# Patient Record
Sex: MALE | Race: WHITE | Employment: OTHER | URBAN - METROPOLITAN AREA
[De-identification: names, ages, dates, MRNs, and addresses within clinical notes are randomized per-mention and may not be internally consistent; named-entity substitution may affect disease eponyms.]

---

## 2017-05-19 DIAGNOSIS — N40.1 BENIGN NODULAR PROSTATIC HYPERPLASIA WITH LOWER URINARY TRACT SYMPTOMS: ICD-10-CM

## 2017-05-19 RX ORDER — DUTASTERIDE 0.5 MG/1
CAPSULE, LIQUID FILLED ORAL
Qty: 30 CAPSULE | Refills: 0 | Status: SHIPPED | OUTPATIENT
Start: 2017-05-19 | End: 2017-05-26 | Stop reason: SDUPTHER

## 2017-05-26 ENCOUNTER — OFFICE VISIT (OUTPATIENT)
Dept: FAMILY MEDICINE CLINIC | Age: 67
End: 2017-05-26

## 2017-05-26 VITALS
HEART RATE: 72 BPM | SYSTOLIC BLOOD PRESSURE: 126 MMHG | TEMPERATURE: 98.1 F | HEIGHT: 72 IN | WEIGHT: 314 LBS | RESPIRATION RATE: 18 BRPM | BODY MASS INDEX: 42.53 KG/M2 | DIASTOLIC BLOOD PRESSURE: 78 MMHG | OXYGEN SATURATION: 97 %

## 2017-05-26 DIAGNOSIS — N40.1 BENIGN NODULAR PROSTATIC HYPERPLASIA WITH LOWER URINARY TRACT SYMPTOMS: ICD-10-CM

## 2017-05-26 DIAGNOSIS — M79.671 RIGHT FOOT PAIN: ICD-10-CM

## 2017-05-26 DIAGNOSIS — I10 ESSENTIAL HYPERTENSION: Primary | ICD-10-CM

## 2017-05-26 DIAGNOSIS — M15.9 PRIMARY OSTEOARTHRITIS INVOLVING MULTIPLE JOINTS: ICD-10-CM

## 2017-05-26 DIAGNOSIS — E78.5 HYPERLIPIDEMIA, UNSPECIFIED HYPERLIPIDEMIA TYPE: ICD-10-CM

## 2017-05-26 DIAGNOSIS — R35.1 NOCTURIA: ICD-10-CM

## 2017-05-26 LAB
ALBUMIN SERPL-MCNC: 4.2 G/DL (ref 3.4–5)
ALP BLD-CCNC: 56 U/L (ref 40–129)
ALT SERPL-CCNC: 31 U/L (ref 10–40)
ANION GAP SERPL CALCULATED.3IONS-SCNC: 12 MMOL/L (ref 3–16)
AST SERPL-CCNC: 20 U/L (ref 15–37)
BILIRUB SERPL-MCNC: 0.4 MG/DL (ref 0–1)
BILIRUBIN DIRECT: <0.2 MG/DL (ref 0–0.3)
BILIRUBIN, INDIRECT: NORMAL MG/DL (ref 0–1)
BUN BLDV-MCNC: 20 MG/DL (ref 7–20)
CALCIUM SERPL-MCNC: 9.4 MG/DL (ref 8.3–10.6)
CHLORIDE BLD-SCNC: 99 MMOL/L (ref 99–110)
CHOLESTEROL, TOTAL: 157 MG/DL (ref 0–199)
CO2: 28 MMOL/L (ref 21–32)
CREAT SERPL-MCNC: 0.9 MG/DL (ref 0.8–1.3)
GFR AFRICAN AMERICAN: >60
GFR NON-AFRICAN AMERICAN: >60
GLUCOSE BLD-MCNC: 95 MG/DL (ref 70–99)
HDLC SERPL-MCNC: 26 MG/DL (ref 40–60)
LDL CHOLESTEROL CALCULATED: 89 MG/DL
POTASSIUM SERPL-SCNC: 4 MMOL/L (ref 3.5–5.1)
PROSTATE SPECIFIC ANTIGEN: 0.23 NG/ML (ref 0–4)
SODIUM BLD-SCNC: 139 MMOL/L (ref 136–145)
TOTAL PROTEIN: 6.9 G/DL (ref 6.4–8.2)
TRIGL SERPL-MCNC: 211 MG/DL (ref 0–150)
VLDLC SERPL CALC-MCNC: 42 MG/DL

## 2017-05-26 PROCEDURE — 4040F PNEUMOC VAC/ADMIN/RCVD: CPT | Performed by: FAMILY MEDICINE

## 2017-05-26 PROCEDURE — 36415 COLL VENOUS BLD VENIPUNCTURE: CPT | Performed by: FAMILY MEDICINE

## 2017-05-26 PROCEDURE — G8417 CALC BMI ABV UP PARAM F/U: HCPCS | Performed by: FAMILY MEDICINE

## 2017-05-26 PROCEDURE — 3017F COLORECTAL CA SCREEN DOC REV: CPT | Performed by: FAMILY MEDICINE

## 2017-05-26 PROCEDURE — 99214 OFFICE O/P EST MOD 30 MIN: CPT | Performed by: FAMILY MEDICINE

## 2017-05-26 PROCEDURE — 1036F TOBACCO NON-USER: CPT | Performed by: FAMILY MEDICINE

## 2017-05-26 PROCEDURE — 1123F ACP DISCUSS/DSCN MKR DOCD: CPT | Performed by: FAMILY MEDICINE

## 2017-05-26 PROCEDURE — G8427 DOCREV CUR MEDS BY ELIG CLIN: HCPCS | Performed by: FAMILY MEDICINE

## 2017-05-26 RX ORDER — LISINOPRIL AND HYDROCHLOROTHIAZIDE 12.5; 1 MG/1; MG/1
TABLET ORAL
Qty: 90 TABLET | Refills: 1 | Status: SHIPPED | OUTPATIENT
Start: 2017-05-26 | End: 2017-11-10 | Stop reason: SDUPTHER

## 2017-05-26 RX ORDER — ROSUVASTATIN CALCIUM 10 MG/1
TABLET, COATED ORAL
Qty: 90 TABLET | Refills: 1 | Status: SHIPPED | OUTPATIENT
Start: 2017-05-26 | End: 2018-02-23 | Stop reason: SDUPTHER

## 2017-05-26 RX ORDER — DUTASTERIDE 0.5 MG/1
CAPSULE, LIQUID FILLED ORAL
Qty: 90 CAPSULE | Refills: 1 | Status: SHIPPED | OUTPATIENT
Start: 2017-05-26 | End: 2017-08-19 | Stop reason: SDUPTHER

## 2017-05-26 RX ORDER — HYDROXYZINE HYDROCHLORIDE 25 MG/1
25 TABLET, FILM COATED ORAL 3 TIMES DAILY PRN
Qty: 60 TABLET | Refills: 2 | Status: SHIPPED | OUTPATIENT
Start: 2017-05-26 | End: 2017-07-22 | Stop reason: SDUPTHER

## 2017-05-26 RX ORDER — NAPROXEN 500 MG/1
500 TABLET ORAL 2 TIMES DAILY WITH MEALS
Qty: 180 TABLET | Refills: 1 | Status: SHIPPED | OUTPATIENT
Start: 2017-05-26 | End: 2017-11-10 | Stop reason: SDUPTHER

## 2017-05-26 RX ORDER — HYDROCHLOROTHIAZIDE 25 MG/1
TABLET ORAL
Qty: 90 TABLET | Refills: 1 | Status: SHIPPED | OUTPATIENT
Start: 2017-05-26 | End: 2017-11-10 | Stop reason: SDUPTHER

## 2017-07-24 RX ORDER — HYDROXYZINE HYDROCHLORIDE 25 MG/1
TABLET, FILM COATED ORAL
Qty: 60 TABLET | Refills: 2 | Status: SHIPPED | OUTPATIENT
Start: 2017-07-24 | End: 2017-10-07 | Stop reason: SDUPTHER

## 2017-08-19 DIAGNOSIS — N40.1 BENIGN NODULAR PROSTATIC HYPERPLASIA WITH LOWER URINARY TRACT SYMPTOMS: ICD-10-CM

## 2017-08-21 RX ORDER — DUTASTERIDE 0.5 MG/1
CAPSULE, LIQUID FILLED ORAL
Qty: 90 CAPSULE | Refills: 1 | Status: SHIPPED | OUTPATIENT
Start: 2017-08-21 | End: 2017-11-10 | Stop reason: SDUPTHER

## 2017-10-09 RX ORDER — HYDROXYZINE HYDROCHLORIDE 25 MG/1
TABLET, FILM COATED ORAL
Qty: 60 TABLET | Refills: 2 | Status: SHIPPED | OUTPATIENT
Start: 2017-10-09 | End: 2017-11-10 | Stop reason: SDUPTHER

## 2017-10-17 RX ORDER — HYDROCHLOROTHIAZIDE 25 MG/1
TABLET ORAL
Qty: 90 TABLET | Refills: 1 | Status: SHIPPED | OUTPATIENT
Start: 2017-10-17 | End: 2018-07-17 | Stop reason: ALTCHOICE

## 2017-11-10 ENCOUNTER — OFFICE VISIT (OUTPATIENT)
Dept: FAMILY MEDICINE CLINIC | Age: 67
End: 2017-11-10

## 2017-11-10 VITALS
HEIGHT: 72 IN | SYSTOLIC BLOOD PRESSURE: 122 MMHG | DIASTOLIC BLOOD PRESSURE: 80 MMHG | BODY MASS INDEX: 42.12 KG/M2 | WEIGHT: 311 LBS | RESPIRATION RATE: 18 BRPM | OXYGEN SATURATION: 97 % | HEART RATE: 78 BPM | TEMPERATURE: 98.4 F

## 2017-11-10 DIAGNOSIS — N40.1 BENIGN NODULAR PROSTATIC HYPERPLASIA WITH LOWER URINARY TRACT SYMPTOMS: ICD-10-CM

## 2017-11-10 DIAGNOSIS — M15.9 PRIMARY OSTEOARTHRITIS INVOLVING MULTIPLE JOINTS: ICD-10-CM

## 2017-11-10 DIAGNOSIS — I10 ESSENTIAL HYPERTENSION: Primary | ICD-10-CM

## 2017-11-10 DIAGNOSIS — E78.5 HYPERLIPIDEMIA, UNSPECIFIED HYPERLIPIDEMIA TYPE: ICD-10-CM

## 2017-11-10 LAB
ALBUMIN SERPL-MCNC: 4 G/DL (ref 3.4–5)
ALP BLD-CCNC: 69 U/L (ref 40–129)
ALT SERPL-CCNC: 18 U/L (ref 10–40)
ANION GAP SERPL CALCULATED.3IONS-SCNC: 18 MMOL/L (ref 3–16)
AST SERPL-CCNC: 19 U/L (ref 15–37)
BILIRUB SERPL-MCNC: 0.4 MG/DL (ref 0–1)
BILIRUBIN DIRECT: <0.2 MG/DL (ref 0–0.3)
BILIRUBIN, INDIRECT: NORMAL MG/DL (ref 0–1)
BUN BLDV-MCNC: 21 MG/DL (ref 7–20)
CALCIUM SERPL-MCNC: 9.3 MG/DL (ref 8.3–10.6)
CHLORIDE BLD-SCNC: 98 MMOL/L (ref 99–110)
CHOLESTEROL, TOTAL: 160 MG/DL (ref 0–199)
CO2: 23 MMOL/L (ref 21–32)
CREAT SERPL-MCNC: 1 MG/DL (ref 0.8–1.3)
GFR AFRICAN AMERICAN: >60
GFR NON-AFRICAN AMERICAN: >60
GLUCOSE BLD-MCNC: 87 MG/DL (ref 70–99)
HDLC SERPL-MCNC: 28 MG/DL (ref 40–60)
LDL CHOLESTEROL CALCULATED: 84 MG/DL
POTASSIUM SERPL-SCNC: 4.3 MMOL/L (ref 3.5–5.1)
SODIUM BLD-SCNC: 139 MMOL/L (ref 136–145)
TOTAL PROTEIN: 6.7 G/DL (ref 6.4–8.2)
TRIGL SERPL-MCNC: 242 MG/DL (ref 0–150)
VLDLC SERPL CALC-MCNC: 48 MG/DL

## 2017-11-10 PROCEDURE — G8484 FLU IMMUNIZE NO ADMIN: HCPCS | Performed by: FAMILY MEDICINE

## 2017-11-10 PROCEDURE — 4040F PNEUMOC VAC/ADMIN/RCVD: CPT | Performed by: FAMILY MEDICINE

## 2017-11-10 PROCEDURE — 1036F TOBACCO NON-USER: CPT | Performed by: FAMILY MEDICINE

## 2017-11-10 PROCEDURE — G8417 CALC BMI ABV UP PARAM F/U: HCPCS | Performed by: FAMILY MEDICINE

## 2017-11-10 PROCEDURE — 3017F COLORECTAL CA SCREEN DOC REV: CPT | Performed by: FAMILY MEDICINE

## 2017-11-10 PROCEDURE — 99214 OFFICE O/P EST MOD 30 MIN: CPT | Performed by: FAMILY MEDICINE

## 2017-11-10 PROCEDURE — 1123F ACP DISCUSS/DSCN MKR DOCD: CPT | Performed by: FAMILY MEDICINE

## 2017-11-10 PROCEDURE — 36415 COLL VENOUS BLD VENIPUNCTURE: CPT | Performed by: FAMILY MEDICINE

## 2017-11-10 PROCEDURE — G8428 CUR MEDS NOT DOCUMENT: HCPCS | Performed by: FAMILY MEDICINE

## 2017-11-10 RX ORDER — LISINOPRIL AND HYDROCHLOROTHIAZIDE 12.5; 1 MG/1; MG/1
TABLET ORAL
Qty: 90 TABLET | Refills: 2 | Status: SHIPPED | OUTPATIENT
Start: 2017-11-10

## 2017-11-10 RX ORDER — NAPROXEN 500 MG/1
500 TABLET ORAL 2 TIMES DAILY WITH MEALS
Qty: 180 TABLET | Refills: 2 | Status: SHIPPED | OUTPATIENT
Start: 2017-11-10 | End: 2017-11-17 | Stop reason: SDUPTHER

## 2017-11-10 RX ORDER — DUTASTERIDE 0.5 MG/1
CAPSULE, LIQUID FILLED ORAL
Qty: 90 CAPSULE | Refills: 2 | Status: SHIPPED | OUTPATIENT
Start: 2017-11-10

## 2017-11-10 RX ORDER — HYDROXYZINE HYDROCHLORIDE 25 MG/1
TABLET, FILM COATED ORAL
Qty: 180 TABLET | Refills: 2 | Status: SHIPPED | OUTPATIENT
Start: 2017-11-10 | End: 2018-02-23

## 2017-11-10 RX ORDER — DICLOFENAC SODIUM 75 MG/1
75 TABLET, DELAYED RELEASE ORAL 2 TIMES DAILY WITH MEALS
Qty: 180 TABLET | Refills: 2 | Status: CANCELLED | OUTPATIENT
Start: 2017-11-10

## 2017-11-10 RX ORDER — ROSUVASTATIN CALCIUM 10 MG/1
TABLET, COATED ORAL
Qty: 90 TABLET | Refills: 2 | Status: SHIPPED | OUTPATIENT
Start: 2017-11-10 | End: 2018-02-23 | Stop reason: CLARIF

## 2017-11-10 RX ORDER — HYDROCHLOROTHIAZIDE 25 MG/1
TABLET ORAL
Qty: 90 TABLET | Refills: 2 | Status: SHIPPED | OUTPATIENT
Start: 2017-11-10 | End: 2018-02-23 | Stop reason: CLARIF

## 2017-11-10 NOTE — PROGRESS NOTES
(CRESTOR) 10 MG tablet     Sig: TAKE 1 TABLET BY MOUTH EVERY DAY     Dispense:  90 tablet     Refill:  2       Follow-up appointment in 6 months.

## 2017-11-17 RX ORDER — ROSUVASTATIN CALCIUM 10 MG/1
TABLET, COATED ORAL
Qty: 90 TABLET | Refills: 2 | Status: SHIPPED | OUTPATIENT
Start: 2017-11-17 | End: 2018-02-23

## 2017-11-17 RX ORDER — NAPROXEN 500 MG/1
TABLET ORAL
Qty: 180 TABLET | Refills: 2 | Status: SHIPPED | OUTPATIENT
Start: 2017-11-17

## 2018-01-15 ENCOUNTER — HOSPITAL ENCOUNTER (OUTPATIENT)
Dept: NUCLEAR MEDICINE | Age: 68
Discharge: HOME | End: 2018-01-15
Admitting: ORTHOPAEDIC SURGERY

## 2018-01-15 DIAGNOSIS — T84.039A LOOSE ORTHOPEDIC IMPLANT, INITIAL ENCOUNTER (HCC): ICD-10-CM

## 2018-01-15 DIAGNOSIS — M17.12 PRIMARY OSTEOARTHRITIS OF LEFT KNEE: ICD-10-CM

## 2018-01-15 DIAGNOSIS — M17.12 UNILATERAL PRIMARY OSTEOARTHRITIS, LEFT KNEE: ICD-10-CM

## 2018-01-15 RX ORDER — TC 99M MEDRONATE 20 MG/10ML
28.5 INJECTION, POWDER, LYOPHILIZED, FOR SOLUTION INTRAVENOUS
Status: COMPLETED | OUTPATIENT
Start: 2018-01-15 | End: 2018-01-15

## 2018-01-15 RX ADMIN — TC 99M MEDRONATE 28.5 MILLICURIE: 20 INJECTION, POWDER, LYOPHILIZED, FOR SOLUTION INTRAVENOUS at 08:54

## 2018-02-23 ENCOUNTER — OFFICE VISIT (OUTPATIENT)
Dept: FAMILY MEDICINE CLINIC | Age: 68
End: 2018-02-23

## 2018-02-23 VITALS
HEART RATE: 106 BPM | WEIGHT: 315 LBS | RESPIRATION RATE: 23 BRPM | TEMPERATURE: 97.8 F | DIASTOLIC BLOOD PRESSURE: 70 MMHG | OXYGEN SATURATION: 98 % | HEIGHT: 72 IN | SYSTOLIC BLOOD PRESSURE: 116 MMHG | BODY MASS INDEX: 42.66 KG/M2

## 2018-02-23 DIAGNOSIS — G40.509 NONINTRACTABLE EPILEPSY DUE TO EXTERNAL CAUSES, WITHOUT STATUS EPILEPTICUS (HCC): ICD-10-CM

## 2018-02-23 DIAGNOSIS — E78.2 MIXED HYPERLIPIDEMIA: ICD-10-CM

## 2018-02-23 DIAGNOSIS — J40 BRONCHITIS: Primary | ICD-10-CM

## 2018-02-23 DIAGNOSIS — R06.2 WHEEZING: ICD-10-CM

## 2018-02-23 DIAGNOSIS — E66.01 MORBID OBESITY WITH BMI OF 40.0-44.9, ADULT (HCC): ICD-10-CM

## 2018-02-23 DIAGNOSIS — R05.9 COUGH: ICD-10-CM

## 2018-02-23 PROCEDURE — 1123F ACP DISCUSS/DSCN MKR DOCD: CPT | Performed by: NURSE PRACTITIONER

## 2018-02-23 PROCEDURE — 3017F COLORECTAL CA SCREEN DOC REV: CPT | Performed by: NURSE PRACTITIONER

## 2018-02-23 PROCEDURE — G8427 DOCREV CUR MEDS BY ELIG CLIN: HCPCS | Performed by: NURSE PRACTITIONER

## 2018-02-23 PROCEDURE — 1036F TOBACCO NON-USER: CPT | Performed by: NURSE PRACTITIONER

## 2018-02-23 PROCEDURE — 4040F PNEUMOC VAC/ADMIN/RCVD: CPT | Performed by: NURSE PRACTITIONER

## 2018-02-23 PROCEDURE — 99213 OFFICE O/P EST LOW 20 MIN: CPT | Performed by: NURSE PRACTITIONER

## 2018-02-23 PROCEDURE — G8417 CALC BMI ABV UP PARAM F/U: HCPCS | Performed by: NURSE PRACTITIONER

## 2018-02-23 PROCEDURE — G8484 FLU IMMUNIZE NO ADMIN: HCPCS | Performed by: NURSE PRACTITIONER

## 2018-02-23 RX ORDER — METHYLPREDNISOLONE 4 MG/1
TABLET ORAL
Qty: 1 KIT | Refills: 0 | Status: SHIPPED | OUTPATIENT
Start: 2018-02-23 | End: 2018-03-01

## 2018-02-23 RX ORDER — AZITHROMYCIN 250 MG/1
TABLET, FILM COATED ORAL
Qty: 6 TABLET | Refills: 0 | Status: SHIPPED | OUTPATIENT
Start: 2018-02-23 | End: 2018-03-05

## 2018-02-23 RX ORDER — GUAIFENESIN AND CODEINE PHOSPHATE 100; 10 MG/5ML; MG/5ML
5 SOLUTION ORAL 3 TIMES DAILY PRN
Qty: 120 ML | Refills: 0 | Status: SHIPPED | OUTPATIENT
Start: 2018-02-23 | End: 2018-03-02

## 2018-02-23 RX ORDER — ROSUVASTATIN CALCIUM 10 MG/1
TABLET, COATED ORAL
Qty: 90 TABLET | Refills: 2 | Status: SHIPPED | OUTPATIENT
Start: 2018-02-23

## 2018-02-23 ASSESSMENT — PATIENT HEALTH QUESTIONNAIRE - PHQ9
2. FEELING DOWN, DEPRESSED OR HOPELESS: 0
1. LITTLE INTEREST OR PLEASURE IN DOING THINGS: 0
SUM OF ALL RESPONSES TO PHQ9 QUESTIONS 1 & 2: 0
SUM OF ALL RESPONSES TO PHQ QUESTIONS 1-9: 0

## 2018-02-23 ASSESSMENT — ENCOUNTER SYMPTOMS
VOMITING: 0
SINUS PRESSURE: 1
CHEST TIGHTNESS: 0
COUGH: 1
NAUSEA: 0
BACK PAIN: 0
RHINORRHEA: 1
CONSTIPATION: 0

## 2018-02-23 NOTE — PROGRESS NOTES
Subjective:      Patient ID: Opal Altamirano is a 79 y.o. male. HPI     To establish care at this office. Previous PCP Dr. April Jones. Retired. 2 weeks ago started with nasal congestion, progressed to cough. Non productive. Taking nyquil, alkaseltzer plus. Review of Systems   Constitutional: Negative for chills and fever. HENT: Positive for rhinorrhea and sinus pressure. Respiratory: Positive for cough. Negative for chest tightness. Cardiovascular: Negative for chest pain and palpitations. Gastrointestinal: Negative for constipation, nausea and vomiting. Musculoskeletal: Negative for arthralgias, back pain and gait problem. Neurological: Negative for dizziness and headaches. Psychiatric/Behavioral: Negative. Objective:   Physical Exam   Constitutional: He is oriented to person, place, and time. He appears well-developed and well-nourished. HENT:   Head: Normocephalic and atraumatic. Right Ear: Tympanic membrane and ear canal normal.   Left Ear: Tympanic membrane and ear canal normal.   Bilateral hearing devices   Neck: Normal range of motion. Neck supple. No thyromegaly present. Cardiovascular: Normal rate, regular rhythm and normal heart sounds. Pulmonary/Chest: Effort normal. No respiratory distress. He has wheezes. Frequent bronchial cough   Abdominal: Soft. Bowel sounds are normal.   Musculoskeletal: Normal range of motion. Lymphadenopathy:     He has no cervical adenopathy. Neurological: He is alert and oriented to person, place, and time. Skin: Skin is warm and dry. Psychiatric: He has a normal mood and affect.      Current Outpatient Prescriptions   Medication Sig Dispense Refill    rosuvastatin (CRESTOR) 10 MG tablet TAKE 1 TABLET BY MOUTH EVERY DAY 90 tablet 2    naproxen (NAPROSYN) 500 MG tablet TAKE 1 TABLET BY MOUTH TWICE A DAY WITH MEALS 180 tablet 2    dutasteride (AVODART) 0.5 MG capsule TAKE ONE CAPSULE BY MOUTH EVERY DAY 90 capsule 2   

## 2018-07-17 ENCOUNTER — OFFICE VISIT (OUTPATIENT)
Dept: DERMATOLOGY | Age: 68
End: 2018-07-17

## 2018-07-17 DIAGNOSIS — Z80.8 FAMILY HISTORY OF SKIN CANCER: ICD-10-CM

## 2018-07-17 DIAGNOSIS — Z87.891 FORMER SMOKER: ICD-10-CM

## 2018-07-17 DIAGNOSIS — I78.1 TELANGIECTASIAS: ICD-10-CM

## 2018-07-17 DIAGNOSIS — L82.1 SEBORRHEIC KERATOSIS: ICD-10-CM

## 2018-07-17 DIAGNOSIS — L57.8 PHOTOAGING OF SKIN: ICD-10-CM

## 2018-07-17 DIAGNOSIS — D23.9 DERMATOFIBROMA: ICD-10-CM

## 2018-07-17 DIAGNOSIS — L57.0 ACTINIC KERATOSIS: Primary | ICD-10-CM

## 2018-07-17 DIAGNOSIS — L85.1 STUCCO KERATOSES: ICD-10-CM

## 2018-07-17 DIAGNOSIS — L72.3 INFLAMED EPIDERMOID CYST OF SKIN: ICD-10-CM

## 2018-07-17 DIAGNOSIS — D22.9 MULTIPLE BENIGN MELANOCYTIC NEVI: ICD-10-CM

## 2018-07-17 PROCEDURE — G8427 DOCREV CUR MEDS BY ELIG CLIN: HCPCS | Performed by: DERMATOLOGY

## 2018-07-17 PROCEDURE — 99203 OFFICE O/P NEW LOW 30 MIN: CPT | Performed by: DERMATOLOGY

## 2018-07-17 PROCEDURE — 1036F TOBACCO NON-USER: CPT | Performed by: DERMATOLOGY

## 2018-07-17 PROCEDURE — 1123F ACP DISCUSS/DSCN MKR DOCD: CPT | Performed by: DERMATOLOGY

## 2018-07-17 PROCEDURE — 1101F PT FALLS ASSESS-DOCD LE1/YR: CPT | Performed by: DERMATOLOGY

## 2018-07-17 PROCEDURE — 3017F COLORECTAL CA SCREEN DOC REV: CPT | Performed by: DERMATOLOGY

## 2018-07-17 PROCEDURE — G8417 CALC BMI ABV UP PARAM F/U: HCPCS | Performed by: DERMATOLOGY

## 2018-07-17 PROCEDURE — 11900 INJECT SKIN LESIONS </W 7: CPT | Performed by: DERMATOLOGY

## 2018-07-17 PROCEDURE — 17000 DESTRUCT PREMALG LESION: CPT | Performed by: DERMATOLOGY

## 2018-07-17 PROCEDURE — 4040F PNEUMOC VAC/ADMIN/RCVD: CPT | Performed by: DERMATOLOGY

## 2018-07-17 NOTE — PROGRESS NOTES
[facility]) Dermatology  [facility], [place], [address]       Venkat Lucas  1950    79 y.o. male     Date of Visit: 2018    Chief Complaint:   Chief Complaint   Patient presents with    New Patient    Skin Exam    Lesion(s)     left calf, right forearm, shoulders, nose        I was asked to see this patient by Dr. Trujillo ref. provider found. History of Present Illness:  Venkat Lucas is a 79 y.o. male who presents with the chief complaint of establish care and for the followin. TBSE. Many year history of multiple nevi on the trunk and extremities, all present for many years. Denies new moles. Denies moles changing in size, shape, color. None associated w/ pain, bleeding, pruritus. 2. Complains of multiple scaly rough bumps located to bilateral calves, present for several years. Denies changes in size, shape, or color. he denies associated burning, bleeding, pain, or itch. 3. Has rough feeling brown bump to left lateral neck present for at least one year. Asymptomatic.  4.10+year history of blood vessels to his nose, at times may bleed if area is traumatized. Had an ENT surgeon perform laser surgery which did improve and decrease the number of blood vessels initially but they have since returned. Denies enlargement of area involved. 5.  Scaly rough spot to right dorsal hand present for at least one year, states has recurring scabbing to continue to pick off at this fingernails but lesion returns. No prior treatment. 6.  Rough thickened bump to his right upper arm present for at least one year, denies any enlargement or change in shape or color. Asymptomatic. 7.Progressive freckling and lentigines located to sun exposed areas over several years,Admits to sun exposure in youth without wearing sunscreen, hats, or protective clothing. Denies regularly wearing sunscreen or protective hats/clothing when outdoors currently.   8.  Has a large bump to the middle of his upper sun protective behavior -- sun avoidance during the peak hours of the day, sun-protective clothing (including hat and sunglasses), sunscreen use (water resistant, broad spectrum, SPF at least 30, need for reapplication every 2 to 3 hours), avoidance of tanning beds   -Plan: Observation with annual skin checks (earlier if indicated) performed in office to monitor current nevi and to assess for new lesions. 6. Dermatofibroma  -Educated patient that dermatofibromas are stable and benign scar-like lesions, only definitive treatment is surgical excision, patient to call office if develops symptoms of pain/tenderness/itch or changes in size,shape, color  -Plan: Reassurance, re: benign nature    7. Telangiectasias  Unusual for hemangioma, portwine stain, AVM to form and not be present since birth or early childhood, not consistent with kaposiform hemangioendothelioma or tufted angioma  Reassurance  -Recommend Vbeam PDL laser treatment, will likely require average 3 monthly treatments to see improvement   - Discussed risks v. Benefits and side effects such as bruising, burning, blistering, swelling, dyspigmentation, etc.   -$200 nose/cheek  -Discussed options for cosmetic tx and associated out of pocket fee. Patient is to call office and schedule cosmetic visit if desired prn. Goal of Vbeam treatment is improvement in appearance and to reduce symptoms of bleeding, likely not permanently resolve the lesion and may require maintanance treatments. RTC PRN    8.  Photoaging of skin  -Patient's skin showing evidence of chronic sun exposure leading to diffuse photodamage and photo-aging  -Educated patient that chronic sun exposure leads to increased risk for skin cancers and to have at least annual skin exams (earlier if indicated) in the office.   -The patient was counseled regarding sun protective practices such as sunscreen use (water resistant, broad spectrum sunscreen with SPF rating of at least 30) and need for

## 2018-07-17 NOTE — PATIENT INSTRUCTIONS
Sun Protection Tips    · Apply broad spectrum water resistant sunscreen with an SPF of at least 30 to exposed areas of the skin. Dont forget the ears and lips! Remember to reapply sunscreen about every 2 hours and after swimming or sweating. · Wear sun protective clothing. Swim shirts (aka. rash guards) are a great idea and negates the need to reapply sunscreen in those areas. · Seek the shade whenever possible especially between the hours of 10am and 4pm when the suns rays are the strongest.     · Avoid tanning beds  · Hats with a wide brim    Seborrheic keratosis    Educational Overview:  Seborrheic keratosis (seb-o-REE-ik care-uh-TOE-sis) is a common benign, or harmless, skin growth that affect people over the age of 27. They are not cancer and do not increase the risk of developing skin cancer. The exact cause is unknown but the tendency to develop SKs seems to be inherited. Almost all adults develop one or more seborrheic keratoses (SKs) and some people may develop many. Some growths may have a warty surface while others look like dabs of warm, brown candle wax on the skin. Seborrheic keratoses range in color from white to black; however, most are tan or brown. You can find these harmless growths anywhere on the skin, except the palms and soles. Most often, youll see them on the chest, back, head, or neck. The condition is more likely with advancing age, and the number of growths often increases over the years. Seborrheic keratoses are not contagious. Because of the benign nature of seborrheic keratoses, they can be left untreated if they are non-problematic  In cases where SKs are consistently irritated with shaving, itch or bleed excessively, enlarge, become irritated by clothing or other sources of contact, or are cosmetically undesirable, please contact your Dermatologist for evaluation and removal recommendations.      Ref: American Academy [employer] D[employer]at[employer]       Cryosurgery (Freezing) Wound Care Instructions    AFTER THE PROCEDURE:    You will notice swelling and redness around the site. This is normal.    You may experience a sharp or sore feeling for the next several days. For this discomfort, you may take acetaminophen (Tylenol©).  A blister may develop at the treated area, sometimes as soon as by the end of the day. After several days, the blister will subside and a scab will form.  If the area is bumped or traumatized during the first few days following freezing, you may develop bleeding into the blister, forming a blood blister. This is nothing to be alarmed about.  If the blister is tense, uncomfortable, or much larger than the site that was frozen, you may pop the blister along its edge with a sterile needle (boiled, heated under a flame, or cleaned with alcohol) to allow the fluid to drain out. If the blister does not bother you, no treatment is needed.  Do NOT peel off the top of the blister roof. It will act as a dressing on top of your wound. WOUND CARE:    You may shower or bathe as usual, but avoid scrubbing the areas that have been frozen.  Cleanse the site twice a day with mild soapy water, and then apply a thin film of white petrolatum (Vaseline©).  You do not need to cover the area, but can if you prefer.  Do NOT allow the site to become dry or crusted, or attempt to dry it out with rubbing alcohol or hydrogen peroxide.  Continue this regimen until the area is pink and healed. Depending on the size and location of your cryosurgery site, healing may take 2 to 4 weeks.  The area may continue to be pink for several weeks, and over the next few months may become darker or lighter than the surrounding skin. This may be a permanent change.

## 2018-08-14 ENCOUNTER — OFFICE VISIT (OUTPATIENT)
Dept: DERMATOLOGY | Age: 68
End: 2018-08-14

## 2018-08-14 DIAGNOSIS — L72.3 INFLAMED EPIDERMOID CYST OF SKIN: Primary | ICD-10-CM

## 2018-08-14 PROCEDURE — 10060 I&D ABSCESS SIMPLE/SINGLE: CPT | Performed by: DERMATOLOGY

## 2018-08-14 RX ORDER — DOXYCYCLINE HYCLATE 100 MG
TABLET ORAL
Qty: 20 TABLET | Refills: 0 | Status: SHIPPED | OUTPATIENT
Start: 2018-08-14 | End: 2018-09-06 | Stop reason: ALTCHOICE

## 2018-08-14 NOTE — PROGRESS NOTES
expressed with pressure. Gauze and paper tape were secured over the wound.   -A wound culture of purulent material was submitted to microbiology.  -Instructed pt to apply warm compresses TID And change bandages frequently as lesions expected to drain often for the coming days/weeks. -Doxycycline 100 mg po bid for 10 days. Edu re: risk of GI upset, esophagitis, risk of pseudotumor cerebri (HA, visual changes), hypersensitivity/rash.   edu re: risk of recurrence    Call office if worsens or does not improve with treatment. Note is transcribed using voice recognition software. Inadvertent computerized transcription errors may be present. Return in about 3 weeks (around 9/4/2018) for cyst recheck.

## 2018-08-17 LAB
GRAM STAIN RESULT: NORMAL
WOUND/ABSCESS: NORMAL

## 2018-09-06 ENCOUNTER — OFFICE VISIT (OUTPATIENT)
Dept: DERMATOLOGY | Age: 68
End: 2018-09-06

## 2018-09-06 DIAGNOSIS — Z87.2 HISTORY OF ACTINIC KERATOSIS: ICD-10-CM

## 2018-09-06 DIAGNOSIS — Z87.2 HISTORY OF SEBACEOUS CYST: Primary | ICD-10-CM

## 2018-09-06 PROCEDURE — 99212 OFFICE O/P EST SF 10 MIN: CPT | Performed by: DERMATOLOGY

## 2018-09-06 PROCEDURE — 1101F PT FALLS ASSESS-DOCD LE1/YR: CPT | Performed by: DERMATOLOGY

## 2018-09-06 PROCEDURE — G8427 DOCREV CUR MEDS BY ELIG CLIN: HCPCS | Performed by: DERMATOLOGY

## 2018-09-06 PROCEDURE — 4040F PNEUMOC VAC/ADMIN/RCVD: CPT | Performed by: DERMATOLOGY

## 2018-09-06 PROCEDURE — 1036F TOBACCO NON-USER: CPT | Performed by: DERMATOLOGY

## 2018-09-06 PROCEDURE — 3017F COLORECTAL CA SCREEN DOC REV: CPT | Performed by: DERMATOLOGY

## 2018-09-06 PROCEDURE — 1123F ACP DISCUSS/DSCN MKR DOCD: CPT | Performed by: DERMATOLOGY

## 2018-09-06 PROCEDURE — G8417 CALC BMI ABV UP PARAM F/U: HCPCS | Performed by: DERMATOLOGY

## 2018-09-06 NOTE — PROGRESS NOTES
2. History of actinic keratosis  Clear  Recommend annual skin exam, The patient was counseled regarding sun protective practices such as sunscreen use (water resistant, broad spectrum sunscreen with SPF rating of at least 30) and need for reapplication at least every 2 hours, sun protective clothing (including hat and sunglasses), avoidance of sun during the peak hours of the day, and avoidance of tanning beds. Note is transcribed using voice recognition software. Inadvertent computerized transcription errors may be present. Return if symptoms worsen or fail to improve.

## 2019-07-23 ENCOUNTER — OFFICE VISIT (OUTPATIENT)
Dept: DERMATOLOGY | Age: 69
End: 2019-07-23
Payer: MEDICARE

## 2019-07-23 DIAGNOSIS — L82.0 INFLAMED SEBORRHEIC KERATOSIS: Primary | ICD-10-CM

## 2019-07-23 DIAGNOSIS — D22.9 MULTIPLE BENIGN MELANOCYTIC NEVI: ICD-10-CM

## 2019-07-23 DIAGNOSIS — L85.3 XEROSIS CUTIS: ICD-10-CM

## 2019-07-23 DIAGNOSIS — I78.1 TELANGIECTASIAS: ICD-10-CM

## 2019-07-23 PROCEDURE — G8421 BMI NOT CALCULATED: HCPCS | Performed by: DERMATOLOGY

## 2019-07-23 PROCEDURE — 17110 DESTRUCTION B9 LES UP TO 14: CPT | Performed by: DERMATOLOGY

## 2019-07-23 PROCEDURE — 1036F TOBACCO NON-USER: CPT | Performed by: DERMATOLOGY

## 2019-07-23 PROCEDURE — 1123F ACP DISCUSS/DSCN MKR DOCD: CPT | Performed by: DERMATOLOGY

## 2019-07-23 PROCEDURE — 3017F COLORECTAL CA SCREEN DOC REV: CPT | Performed by: DERMATOLOGY

## 2019-07-23 PROCEDURE — 4040F PNEUMOC VAC/ADMIN/RCVD: CPT | Performed by: DERMATOLOGY

## 2019-07-23 PROCEDURE — 99213 OFFICE O/P EST LOW 20 MIN: CPT | Performed by: DERMATOLOGY

## 2019-07-23 PROCEDURE — G8427 DOCREV CUR MEDS BY ELIG CLIN: HCPCS | Performed by: DERMATOLOGY

## 2019-07-23 NOTE — PROGRESS NOTES
[facility]) Dermatology  [facility], [place], [address]       Jessica Barrios  1950    71 y.o. male     Date of Visit: 2019    Chief Complaint:   Chief Complaint   Patient presents with    Skin Exam     moles, TBSE         I was asked to see this patient by Dr. Trujillo ref. provider found. History of Present Illness:  Jessica Barrios is a 71 y.o. male who presents with the chief complaint of the followin. Total body skin cancer screening exam.Many year history of multiple nevi on the head/neck, trunk and extremities, all present for many years. Denies new moles. Denies moles changing in size, shape, color. None associated w/ pain, bleeding, pruritus. 2.  10+ year history of tunneling ectasias to patient's nose extending to right cheek. History of phototherapy performed by ENT surgeon with modest improvement although thinks the blood vessels have become more prominent over the recent years. Continues to consider Vbeam PDL treatment and will schedule as needed. 3.  Complains of a scaly pruritic spot that he continues to scratch with his fingernails located to right shin, present for at least 3 months. Denies any bleeding or pain. 4.  Complains of chronic dry skin for 10+ years located to his arms and legs. Finds lower legs around ankles with itch which she contributes to dryness. Occasionally uses a generic moisturizing lotion which does help to decrease the flaking, dryness, itching. Denies a rash. Admits to sun exposure in youth without wearing sunscreen, hats, or protective clothing. Denies regularly wearing sunscreen or protective hats/clothing when outdoors currently. Review of Systems:  Constitutional: Reports general sense of well-being   Skin: No new or changing moles, no history of keloids or hypertrophic scars. Heme: No abnormal bruising or bleeding. Past Medical History, Family History, Surgical History, Medications and Allergies reviewed.     Past Skin

## 2019-12-02 ENCOUNTER — OFFICE VISIT (OUTPATIENT)
Dept: DERMATOLOGY | Age: 69
End: 2019-12-02
Payer: MEDICARE

## 2019-12-02 DIAGNOSIS — L72.11 PILAR CYST: Primary | ICD-10-CM

## 2019-12-02 DIAGNOSIS — Z79.2 PROPHYLACTIC ANTIBIOTIC: ICD-10-CM

## 2019-12-02 PROCEDURE — 1123F ACP DISCUSS/DSCN MKR DOCD: CPT | Performed by: DERMATOLOGY

## 2019-12-02 PROCEDURE — 3017F COLORECTAL CA SCREEN DOC REV: CPT | Performed by: DERMATOLOGY

## 2019-12-02 PROCEDURE — G8421 BMI NOT CALCULATED: HCPCS | Performed by: DERMATOLOGY

## 2019-12-02 PROCEDURE — G8484 FLU IMMUNIZE NO ADMIN: HCPCS | Performed by: DERMATOLOGY

## 2019-12-02 PROCEDURE — 1036F TOBACCO NON-USER: CPT | Performed by: DERMATOLOGY

## 2019-12-02 PROCEDURE — G8427 DOCREV CUR MEDS BY ELIG CLIN: HCPCS | Performed by: DERMATOLOGY

## 2019-12-02 PROCEDURE — 4040F PNEUMOC VAC/ADMIN/RCVD: CPT | Performed by: DERMATOLOGY

## 2019-12-02 PROCEDURE — 99212 OFFICE O/P EST SF 10 MIN: CPT | Performed by: DERMATOLOGY

## 2019-12-02 RX ORDER — CEPHALEXIN 500 MG/1
CAPSULE ORAL
Qty: 4 CAPSULE | Refills: 0 | Status: SHIPPED | OUTPATIENT
Start: 2019-12-02 | End: 2021-02-02

## 2019-12-05 ENCOUNTER — PROCEDURE VISIT (OUTPATIENT)
Dept: DERMATOLOGY | Age: 69
End: 2019-12-05
Payer: MEDICARE

## 2019-12-05 VITALS — SYSTOLIC BLOOD PRESSURE: 109 MMHG | DIASTOLIC BLOOD PRESSURE: 67 MMHG | WEIGHT: 314.6 LBS | BODY MASS INDEX: 42.67 KG/M2

## 2019-12-05 DIAGNOSIS — L72.11 PILAR CYST: Primary | ICD-10-CM

## 2019-12-05 DIAGNOSIS — L57.0 ACTINIC KERATOSIS: ICD-10-CM

## 2019-12-05 PROCEDURE — 12042 INTMD RPR N-HF/GENIT2.6-7.5: CPT | Performed by: DERMATOLOGY

## 2019-12-05 PROCEDURE — 17000 DESTRUCT PREMALG LESION: CPT | Performed by: DERMATOLOGY

## 2019-12-05 PROCEDURE — 11421 EXC H-F-NK-SP B9+MARG 0.6-1: CPT | Performed by: DERMATOLOGY

## 2019-12-10 LAB — DERMATOLOGY PATHOLOGY REPORT: NORMAL

## 2019-12-12 ENCOUNTER — TELEPHONE (OUTPATIENT)
Dept: DERMATOLOGY | Age: 69
End: 2019-12-12

## 2019-12-16 ENCOUNTER — NURSE ONLY (OUTPATIENT)
Dept: DERMATOLOGY | Age: 69
End: 2019-12-16

## 2019-12-16 DIAGNOSIS — L72.11 PILAR CYST: Primary | ICD-10-CM

## 2019-12-16 PROCEDURE — 99024 POSTOP FOLLOW-UP VISIT: CPT | Performed by: DERMATOLOGY

## 2020-02-14 ENCOUNTER — OFFICE VISIT (OUTPATIENT)
Dept: DERMATOLOGY | Age: 70
End: 2020-02-14

## 2020-02-14 PROCEDURE — DM01310 VBEAM LASER SMALL OR 2 AREAS: Performed by: DERMATOLOGY

## 2020-02-14 NOTE — PATIENT INSTRUCTIONS
Sun Protection Tips    Apply broad spectrum water resistant sunscreen with an SPF of at least 30 to exposed areas of the skin. Dont forget the ears and lips! Remember to reapply sunscreen about every 2 hours and after swimming or sweating. Wear sun protective clothing. Swim shirts (aka. rash guards) are a great idea and negates the need to reapply sunscreen in those areas. Seek the shade whenever possible especially between the hours of 10am and 4pm when the suns rays are the strongest.     Avoid tanning beds          Wear a wide brim hat while in the sun        Following the procedure, the treated areas may be red or appear bruised and will likely be swollen for a few hours or up to a few days. If brown spots were treated today, expect that they will darken first for about one week before shedding off. The affected areas should be treated delicately following treatment. Discomfort and swelling should be treated with the application of hourly cool compresses the evening of the procedure. Avoid applying ice directly to the skin. If your face was treated, you may want to sleep with your head elevated on an extra pillow to help minimize swelling. Use Aquaphor daily as needed to treated areas. Multiple consecutive treatments may be needed to achieve desired outcome or significant improvement. Wear sunscreen daily. Avoid extra aspirin, ibuprofen, vitamin E following the procedure - this may increase your chance of bruising. Improper care of the treated area while the discoloration is present may increase the chance of scarring, hypo- or hyper-pigmentation, or skin textural changes to the treated area. Please call the office if you have excessive discomfort, herpes simplex virus flare, or burns, blisters, or scabbing. Thanks for your visit! Feel free to send  Dr. Juanita Arrington assistant, Venus, a [employer] message for any questions, concerns or  to schedule your cosmetic procedures.

## 2020-07-28 ENCOUNTER — OFFICE VISIT (OUTPATIENT)
Dept: DERMATOLOGY | Age: 70
End: 2020-07-28
Payer: MEDICARE

## 2020-07-28 VITALS — TEMPERATURE: 97.4 F

## 2020-07-28 PROCEDURE — 17003 DESTRUCT PREMALG LES 2-14: CPT | Performed by: DERMATOLOGY

## 2020-07-28 PROCEDURE — 3017F COLORECTAL CA SCREEN DOC REV: CPT | Performed by: DERMATOLOGY

## 2020-07-28 PROCEDURE — 1123F ACP DISCUSS/DSCN MKR DOCD: CPT | Performed by: DERMATOLOGY

## 2020-07-28 PROCEDURE — 4040F PNEUMOC VAC/ADMIN/RCVD: CPT | Performed by: DERMATOLOGY

## 2020-07-28 PROCEDURE — 1036F TOBACCO NON-USER: CPT | Performed by: DERMATOLOGY

## 2020-07-28 PROCEDURE — G8417 CALC BMI ABV UP PARAM F/U: HCPCS | Performed by: DERMATOLOGY

## 2020-07-28 PROCEDURE — G8427 DOCREV CUR MEDS BY ELIG CLIN: HCPCS | Performed by: DERMATOLOGY

## 2020-07-28 PROCEDURE — 17000 DESTRUCT PREMALG LESION: CPT | Performed by: DERMATOLOGY

## 2020-07-28 PROCEDURE — 99213 OFFICE O/P EST LOW 20 MIN: CPT | Performed by: DERMATOLOGY

## 2020-07-28 NOTE — PROGRESS NOTES
[facility]) Dermatology  [place], [place], [address]       Kellen Stratton  1950    79 y.o. male     Date of Visit: 2020    Chief Complaint:   Chief Complaint   Patient presents with    Skin Exam     tbse, moles. ,        I was asked to see this patient by Dr. Trujillo ref. provider found. History of Present Illness:  Kellen Stratton is a 79 y.o. male who presents with the chief complaint of the followin. Total body skin cancer screening exam.Many year history of multiple nevi on the head/neck, trunk and extremities, all present for many years. Denies new moles. Denies moles changing in size, shape, color. None associated w/ pain, bleeding, pruritus. 2.  History of hemangioma to nasal bridge and right nasal sidewall extending to right medial malar cheek status post 1 Vbeam treatment in 2020. Patient has noted significant improvement is very satisfied thus far. Had minimal adverse side effects with treatment. 3.  Complains of a rough feeling bump to right calf present for at least 1 month. he denies associated burning, bleeding, pain, or itch. Denies changes in size, shape, or color. 4.  Has noted a few dry spots appearing to his left preauricular cheek over the last 1 month. No prior treatment. Admits to sun exposure in youth without wearing sunscreen, hats, or protective clothing.  Denies regularly wearing sunscreen or protective hats/clothing when outdoors currently    Review of Systems:  Constitutional: Reports general sense of well-being   Skin: No new or changing moles, no tendency to develop thick scars, no interval of severe sunburns  Heme: No abnormal bruising or bleeding. Past Medical History, Family History, Surgical History, Medications and Allergies reviewed.     Past Skin Hx:  -hx of hemangioma to nasal bridge and right nasal sidewall extending to right medial malar cheek-status post Vbeam PDL laser (2020); status post phototherapy treatment by ENT History    Marital status:      Spouse name: Leandra Frederick Number of children: Not on file    Years of education: Not on file    Highest education level: Not on file   Occupational History    Not on file   Social Needs    Financial resource strain: Not on file    Food insecurity     Worry: Not on file     Inability: Not on file    Transportation needs     Medical: Not on file     Non-medical: Not on file   Tobacco Use    Smoking status: Former Smoker     Last attempt to quit: 2007     Years since quittin.5    Smokeless tobacco: Never Used   Substance and Sexual Activity    Alcohol use: Yes     Alcohol/week: 0.0 standard drinks     Comment: occasional- 1 beer monthly    Drug use: No    Sexual activity: Yes     Partners: Female   Lifestyle    Physical activity     Days per week: Not on file     Minutes per session: Not on file    Stress: Not on file   Relationships    Social connections     Talks on phone: Not on file     Gets together: Not on file     Attends [employer] service: Not on file     Active member of club or organization: Not on file     Attends meetings of clubs or organizations: Not on file     Relationship status: Not on file    Intimate partner violence     Fear of current or ex partner: Not on file     Emotionally abused: Not on file     Physically abused: Not on file     Forced sexual activity: Not on file   Other Topics Concern    Not on file   Social History Narrative    Not on file       Physical Examination     The following were examined and determined to be normal: Psych/Neuro, Scalp/hair, Conjunctivae/eyelids, Gums/teeth/lips, Neck, Breast/axilla/chest, Abdomen, Back, RUE, LUE, RLE, LLE and Nails/digits. The following were examined and determined to be abnormal: Head/face.      Bach phototype:     -Constitutional: Well appearing, no acute distress  -Neurological: Alert and oriented X 3  -Mood and Affect: Pleasant  Total body skin exam performed, areas examined listed above:   1.ill defined irreg shaped gritty keratotic pink macule(s) located to left preauricular cheek and (4), right preauricular cheek  2. Scattered on the head,neck, trunk and extremities are multiple well-defined round and oval symmetric smoothly-bordered uniformly brown macules and papules. no change in size/shape/color of any lesions; no bleeding lesions. 3. Right calf- stuck-on appearing tan-brown verrucous papule  4. nasal bridge and right nasal sidewall extending to right medial malar cheek-red-violaceous macules remain after vbeam treatment     Assessment and Plan     1. Actinic keratoses    2. Multiple benign melanocytic nevi    3. Seborrheic keratosis    4. Hemangioma of face        1. Actinic keratoses  -Edu re: relationship with chronic cumulative sun exposure, low premalignant potential.   Verbal consent obtained.   -Left preauricular cheek and right preauricular cheek, 5 lesion(s) treated w/ liquid nitrogen x 1cycles, 3 seconds each located    Edu re: risk of blister formation, discomfort, scar, dyspigmentation. Discussed wound care. -Reviewed sun protective behavior -- sun avoidance during the peak hours of the day, sun-protective clothing (including hat and sunglasses), sunscreen use (water resistant, broad spectrum, SPF at least 30, need for reapplication every 2 to 3 hours). -Patient to contact office if AK fails to resolve despite treatment, or if patient develops side effect from therapy, such as unbearable crusting, scabbing, redness, or tenderness.         2. Multiple benign melanocytic nevi  Benign acquired melanocytic nevi  -Recommend monthly self skin exams   -Educated regarding the ABCDEs of melanoma detection   -Call for any new/changing moles or concerning lesions  -Reviewed sun protective behavior -- sun avoidance during the peak hours of the day, sun-protective clothing (including hat and sunglasses), sunscreen use (water resistant, broad spectrum, SPF at least

## 2020-07-28 NOTE — PATIENT INSTRUCTIONS
Sun Protection Tips    Apply broad spectrum water resistant sunscreen with an SPF of at least 30 to exposed areas of the skin. Dont forget the ears and lips! Remember to reapply sunscreen about every 2 hours and after swimming or sweating. Wear sun protective clothing. Swim shirts (aka. rash guards) are a great idea and negates the need to reapply sunscreen in those areas. Seek the shade whenever possible especially between the hours of 10am and 4pm when the suns rays are the strongest.     Avoid tanning beds          Wear a wide brim hat while in the sun     Cryosurgery (Freezing) Wound Care Instructions    AFTER THE PROCEDURE:    You will notice swelling and redness around the site. This is normal.    You may experience a sharp or sore feeling for the next several days. For this discomfort, you may take acetaminophen (Tylenol©).  A blister may develop at the treated area, sometimes as soon as by the end of the day. After several days, the blister will subside and a scab will form.  If the area is bumped or traumatized during the first few days following freezing, you may develop bleeding into the blister, forming a blood blister. This is nothing to be alarmed about.  If the blister is tense, uncomfortable, or much larger than the site that was frozen, you may pop the blister along its edge with a sterile needle (boiled, heated under a flame, or cleaned with alcohol) to allow the fluid to drain out. If the blister does not bother you, no treatment is needed.  Do NOT peel off the top of the blister roof. It will act as a dressing on top of your wound. WOUND CARE:    You may shower or bathe as usual, but avoid scrubbing the areas that have been frozen.  Cleanse the site twice a day with mild soapy water, and then apply a thin film of white petrolatum (Vaseline©).  You do not need to cover the area, but can if you prefer.     Do NOT allow the site to become dry or crusted, or attempt

## 2020-09-11 ENCOUNTER — PROCEDURE VISIT (OUTPATIENT)
Dept: DERMATOLOGY | Age: 70
End: 2020-09-11

## 2020-09-11 VITALS — TEMPERATURE: 97.3 F

## 2020-09-11 PROCEDURE — DM01310 VBEAM LASER SMALL OR 2 AREAS: Performed by: DERMATOLOGY

## 2020-09-11 NOTE — PROGRESS NOTES
[facility]) Dermatology  [facility], [address] [place] [address]       Elli Dye  1950    79 y.o. male     Date of Visit: 9/11/2020    Chief Complaint:   Chief Complaint   Patient presents with    Laser Treatment     Nose        I was asked to see this patient by Dr. Trujillo ref. provider found. History of Present Illness:  Elli Dye is a 79 y.o. male who presents with the chief complaint of hemangioma to nasal bridge and right nasal sidewall extending onto medial malar cheek with several telangectasias to nose and right medial malar cheek. Status post 1 Vbeam PDL laser treatment in February 2020. He has noted significant improvement with decreased redness/violaceous discoloration. No longer bleeds easily with trauma. He is very satisfied with improvement like to proceed with Vbeam treatment #2. Follow-up appointment was delayed due to COVID-19 pandemic. Presents today for treatment #2 with Vbeam PDL laser questions answered in detail. Risks v. Benefits discussed. Patient aware may take multiple monthly treatments to attain significant improvement.  -Denies history of cold sores/herpes labialis      PATIENT IDENTIFIED PER PROTOCOL: yes  LOCATION(S): Nasal bridge and right nasal sidewall extending to right medial malar cheek  VERIFIED AND MARKED: yes  TECHNIQUES, RISKS, BENEFITS AND ALTERNATIVES EXPLAINED: yes  CONSENT SIGNED, WITNESSED AND DATED: yes      RISKS: Pain with treatment, swelling, pigmentary changes, scarring, blisters/crusting, non-resolution, recurrence, unwanted cosmetic outcome, the need for multiple treatments. We discussed treatment options, including no treatment as well as the following:  - need for multiple treatments and risk of incomplete clearance, recurrence  - risk of erythema, edema, purpura, dyspigmentation and scarring    In addition, the importance of sun avoidance/protection and avoiding manipulation to the areas were emphasized with the patient.       Review of Systems:  Constitutional: Reports general sense of well-being   Skin: No new or changing moles, no history of keloids or hypertrophic scars. Heme: No abnormal bruising or bleeding. Past Medical History, Family History, Surgical History, Medications and Allergies reviewed. Past Skin Hx:      Family History   Problem Relation Age of Onset    Cancer Mother     Cancer Father         skin- unsure of type    Heart Disease Father      Past Medical History:   Diagnosis Date    Hyperlipidemia     Hypertension     Seizure ([address]     last one 40 years ago     Past Surgical History:   Procedure Laterality Date    COLONOSCOPY  07/2012    polyp    HAND SURGERY Right 6495    hydraulic lift, partially severed hand, nerve and tendon repair.  JOINT REPLACEMENT Left 2008    knee    JOINT REPLACEMENT Right 2007    knee       No Known Allergies  Outpatient Medications Marked as Taking for the 9/11/20 encounter (Procedure visit) with Osmany Stephenson, DO   Medication Sig Dispense Refill    cephALEXin (KEFLEX) 500 MG capsule Bring prescription to office on day of surgery, will be instructed how to take medication at that time. 4 capsule 0    rosuvastatin (CRESTOR) 10 MG tablet TAKE 1 TABLET BY MOUTH EVERY DAY 90 tablet 2    naproxen (NAPROSYN) 500 MG tablet TAKE 1 TABLET BY MOUTH TWICE A DAY WITH MEALS 180 tablet 2    dutasteride (AVODART) 0.5 MG capsule TAKE ONE CAPSULE BY MOUTH EVERY DAY 90 capsule 2    lisinopril-hydrochlorothiazide (PRINZIDE;ZESTORETIC) 10-12.5 MG per tablet TAKE 1 TABLET EVERY DAY 90 tablet 2    aspirin 81 MG EC tablet Take 81 mg by mouth daily.            Social History:   Social History     Socioeconomic History    Marital status:      Spouse name: Nayely Prince Number of children: Not on file    Years of education: Not on file    Highest education level: Not on file   Occupational History    Not on file   Social Needs    Financial resource strain: Not on file   ezeep-Kaylin insecurity Worry: Not on file     Inability: Not on file    Transportation needs     Medical: Not on file     Non-medical: Not on file   Tobacco Use    Smoking status: Former Smoker     Last attempt to quit: 2007     Years since quittin.7    Smokeless tobacco: Never Used   Substance and Sexual Activity    Alcohol use: Yes     Alcohol/week: 0.0 standard drinks     Comment: occasional- 1 beer monthly    Drug use: No    Sexual activity: Yes     Partners: Female   Lifestyle    Physical activity     Days per week: Not on file     Minutes per session: Not on file    Stress: Not on file   Relationships    Social connections     Talks on phone: Not on file     Gets together: Not on file     Attends Congregation service: Not on file     Active member of club or organization: Not on file     Attends meetings of clubs or organizations: Not on file     Relationship status: Not on file    Intimate partner violence     Fear of current or ex partner: Not on file     Emotionally abused: Not on file     Physically abused: Not on file     Forced sexual activity: Not on file   Other Topics Concern    Not on file   Social History Narrative    Not on file       Physical Examination     Well appearing, A&Ox3, NAD  -nasal bridge extending to nasal tip and right nasal sidewall onto right medial malar cheek-significantly decreased number of red-violaceous vascular macules, few red daily ectasias to nasal alar creases, and nasal sidewall extending to right medial malar cheek     Pictures prior to treatment #2          1.  TREATMENT #2  AREA TO BE TREATED: Nasal bridge and right nasal sidewall extending onto medial malar cheek  DIAGNOSIS, LOCATION, PROCEDURE RECONFIRMED: yes  EYE PROTECTION: yes  ANESTHESIA/PRE-OP MEDICATIONS: none  LASER SETTINGS:  (1)  WAVELENGTH: 595 LENS:7mm spot size FLUENCE: 6.0 J/cm2 PULSE DURATION: 1.5m/s COOLINms spray/20 delay  (2) WAVELENGTH: 595 LENS: 7mm spot size FLUENCE: 11.0 J/cm2 PULSE DURATION: 10m/s COOLINms spray/20 delay    Procedure note:  (1) single stack (+) persistent purpura  (2) Single stack and focal double stack  (+) focal transient purpura    Assessment and Plan     1. Hemangioma of face    2. Telangiectasias        1. Hemangioma of face  Significantly improved   vbeam PDL laser treatment as outlined above. Patient educated to wear sunscreen of at least SPF 30 daily to face, neck, chest and other sun exposed areas    POST-OPERATIVE CARE/DISPOSITION: aquaphor and ice,  patient tolerated procedure well, left in stable condition  COMPLICATIONS: none  MEDICATIONS: none  WOUND CARE INSTRUCTIONS PROVIDED: yes, patient cooled treated areas with ice pack for 5 minutes before leaving, Instructed to ice the area for 5 minutes every hour for 5 hours at home    Discussed with patient that she will have redness, discoloration, swelling, and likely bruising and may take 2-3 weeks to resolve. She is to call the office if she experiences any adverse side effects that are concerning to her. F/u 4-6 weeks Vbeam #3    2. telangectasias  See above. 2. Elective procedure for unacceptable cosmetic appearance      $200      Note is transcribed using voice recognition software. Inadvertent computerized transcription errors may be present.     Return for 4-6 weeks for Vbeam.

## 2020-09-11 NOTE — PATIENT INSTRUCTIONS
Sun Protection Tips    Apply broad spectrum water resistant sunscreen with an SPF of at least 30 to exposed areas of the skin. Dont forget the ears and lips! Remember to reapply sunscreen about every 2 hours and after swimming or sweating. Wear sun protective clothing. Swim shirts (aka. rash guards) are a great idea and negates the need to reapply sunscreen in those areas. Seek the shade whenever possible especially between the hours of 10am and 4pm when the suns rays are the strongest.     Avoid tanning beds          Wear a wide brim hat while in the sun        Following the procedure, the treated areas may be red or appear bruised and will likely be swollen for a few hours or up to a few days. If brown spots were treated today, expect that they will darken first for about one week before shedding off. The affected areas should be treated delicately following treatment. Discomfort and swelling should be treated with the application of hourly cool compresses the evening of the procedure. Avoid applying ice directly to the skin. If your face was treated, you may want to sleep with your head elevated on an extra pillow to help minimize swelling. Use Aquaphor daily as needed to treated areas. Multiple consecutive treatments may be needed to achieve desired outcome or significant improvement. Wear sunscreen daily. Avoid extra aspirin, ibuprofen, vitamin E following the procedure - this may increase your chance of bruising. Improper care of the treated area while the discoloration is present may increase the chance of scarring, hypo- or hyper-pigmentation, or skin textural changes to the treated area. Please call the office if you have excessive discomfort, herpes simplex virus flare, or burns, blisters, or scabbing. Thanks for your visit! Feel free to send  Dr. Neena Aguilar assistantVenus, a [employer] message for any questions, concerns or  to schedule your cosmetic procedures.

## 2020-10-16 ENCOUNTER — PROCEDURE VISIT (OUTPATIENT)
Dept: DERMATOLOGY | Age: 70
End: 2020-10-16

## 2020-10-16 VITALS — TEMPERATURE: 97 F

## 2020-10-16 PROCEDURE — DM01310 VBEAM LASER SMALL OR 2 AREAS: Performed by: DERMATOLOGY

## 2020-10-16 NOTE — PATIENT INSTRUCTIONS
Sun Protection Tips    Apply broad spectrum water resistant sunscreen with an SPF of at least 30 to exposed areas of the skin. Dont forget the ears and lips! Remember to reapply sunscreen about every 2 hours and after swimming or sweating. Wear sun protective clothing. Swim shirts (aka. rash guards) are a great idea and negates the need to reapply sunscreen in those areas. Seek the shade whenever possible especially between the hours of 10am and 4pm when the suns rays are the strongest.     Avoid tanning beds          Wear a wide brim hat while in the sun        Following the procedure, the treated areas may be red or appear bruised and will likely be swollen for a few hours or up to a few days. If brown spots were treated today, expect that they will darken first for about one week before shedding off. The affected areas should be treated delicately following treatment. Discomfort and swelling should be treated with the application of hourly cool compresses the evening of the procedure. Avoid applying ice directly to the skin. If your face was treated, you may want to sleep with your head elevated on an extra pillow to help minimize swelling. Use Aquaphor daily as needed to treated areas. Multiple consecutive treatments may be needed to achieve desired outcome or significant improvement. Wear sunscreen daily. Avoid extra aspirin, ibuprofen, vitamin E following the procedure - this may increase your chance of bruising. Improper care of the treated area while the discoloration is present may increase the chance of scarring, hypo- or hyper-pigmentation, or skin textural changes to the treated area. Please call the office if you have excessive discomfort, herpes simplex virus flare, or burns, blisters, or scabbing. Thanks for your visit! Feel free to send  Dr. Rosanna Bland assistant, Venus, a [employer] message for any questions, concerns or  to schedule your cosmetic procedures.

## 2020-10-16 NOTE — PROGRESS NOTES
[facility] Dermatology  Bedelia Ao, [address] [address]       [address] [facility]  1950    79 y.o. male     Date of Visit: 10/16/2020    Chief Complaint:   Chief Complaint   Patient presents with    Procedure     vbeam on nose        I was asked to see this patient by Dr. Trujillo ref. provider found. History of Present Illness:  [id number] [facility] Jaspreet is a 79 y.o. male who presents with the chief complaint of hemangioma to nasal bridge and right nasal sidewall extending onto medial malar cheek with several telangectasias to nose and right medial malar cheek. Status post 2 Vbeam PDL laser treatment in February 2020, Sept 2020. He has noted significant improvement with decreased redness/violaceous discoloration since initial appt. No longer bleeds easily with trauma. Presents today for treatment #3 with Vbeam PDL laser questions answered in detail. Risks v. Benefits discussed. Patient aware may take multiple monthly treatments to attain significant improvement.  -Denies history of cold sores/herpes labialis      PATIENT IDENTIFIED PER PROTOCOL: yes  LOCATION(S): Nasal bridge and right nasal sidewall extending to right medial malar cheek  VERIFIED AND MARKED: yes  TECHNIQUES, RISKS, BENEFITS AND ALTERNATIVES EXPLAINED: yes  CONSENT SIGNED, WITNESSED AND DATED: yes      RISKS: Pain with treatment, swelling, pigmentary changes, scarring, blisters/crusting, non-resolution, recurrence, unwanted cosmetic outcome, the need for multiple treatments. We discussed treatment options, including no treatment as well as the following:  - need for multiple treatments and risk of incomplete clearance, recurrence  - risk of erythema, edema, purpura, dyspigmentation and scarring    In addition, the importance of sun avoidance/protection and avoiding manipulation to the areas were emphasized with the patient.       Review of Systems:  Constitutional: Reports general sense of well-being   Skin: No new or changing moles, no history of keloids or hypertrophic scars. Heme: No abnormal bruising or bleeding. Past Medical History, Family History, Surgical History, Medications and Allergies reviewed. Family History   Problem Relation Age of Onset    Cancer Mother     Cancer Father         skin- unsure of type    Heart Disease Father      Past Medical History:   Diagnosis Date    Hyperlipidemia     Hypertension     Seizure ([address]     last one 40 years ago     Past Surgical History:   Procedure Laterality Date    COLONOSCOPY  07/2012    polyp    HAND SURGERY Right 5068    hydraulic lift, partially severed hand, nerve and tendon repair.  JOINT REPLACEMENT Left 2008    knee    JOINT REPLACEMENT Right 2007    knee       No Known Allergies  Outpatient Medications Marked as Taking for the 10/16/20 encounter (Procedure visit) with Aggie Rodriguez, DO   Medication Sig Dispense Refill    cephALEXin (KEFLEX) 500 MG capsule Bring prescription to office on day of surgery, will be instructed how to take medication at that time. 4 capsule 0    rosuvastatin (CRESTOR) 10 MG tablet TAKE 1 TABLET BY MOUTH EVERY DAY 90 tablet 2    naproxen (NAPROSYN) 500 MG tablet TAKE 1 TABLET BY MOUTH TWICE A DAY WITH MEALS 180 tablet 2    dutasteride (AVODART) 0.5 MG capsule TAKE ONE CAPSULE BY MOUTH EVERY DAY 90 capsule 2    lisinopril-hydrochlorothiazide (PRINZIDE;ZESTORETIC) 10-12.5 MG per tablet TAKE 1 TABLET EVERY DAY 90 tablet 2    aspirin 81 MG EC tablet Take 81 mg by mouth daily.            Social History:   Social History     Socioeconomic History    Marital status:      Spouse name: Ignacia Doss Number of children: Not on file    Years of education: Not on file    Highest education level: Not on file   Occupational History    Not on file   Social Needs    Financial resource strain: Not on file    Food insecurity     Worry: Not on file     Inability: Not on file    Transportation needs     Medical: Not on file     Non-medical: Not on file   Tobacco Use    Smoking status: Former Smoker     Last attempt to quit: 2007     Years since quittin.7    Smokeless tobacco: Never Used   Substance and Sexual Activity    Alcohol use: Yes     Alcohol/week: 0.0 standard drinks     Comment: occasional- 1 beer monthly    Drug use: No    Sexual activity: Yes     Partners: Female   Lifestyle    Physical activity     Days per week: Not on file     Minutes per session: Not on file    Stress: Not on file   Relationships    Social connections     Talks on phone: Not on file     Gets together: Not on file     Attends Jewish service: Not on file     Active member of club or organization: Not on file     Attends meetings of clubs or organizations: Not on file     Relationship status: Not on file    Intimate partner violence     Fear of current or ex partner: Not on file     Emotionally abused: Not on file     Physically abused: Not on file     Forced sexual activity: Not on file   Other Topics Concern    Not on file   Social History Narrative    Not on file       Physical Examination     Well appearing, A&Ox3, NAD  -nasal bridge extending to nasal tip and right nasal sidewall onto right medial malar cheek-decreased number of red-violaceous vascular macules, few red telangectasias to nasal alar creases and nasal sidewall  Pictures prior to treatment #3        1.  TREATMENT #3  AREA TO BE TREATED: Nasal bridge and right nasal sidewall extending onto medial malar cheek  DIAGNOSIS, LOCATION, PROCEDURE RECONFIRMED: yes  EYE PROTECTION: yes  ANESTHESIA/PRE-OP MEDICATIONS: none  LASER SETTINGS:  (1)  WAVELENGTH: 595 LENS:7mm spot size FLUENCE: 8.0 J/cm2 PULSE DURATION: 1.5m/s COOLINms spray/20 delay  (2) WAVELENGTH: 595 LENS: 7mm spot size FLUENCE: 11.0 J/cm2 PULSE DURATION: 10m/s COOLINms spray/20 delay    Procedure note:  (1) single stack (+) persistent purpura  (2) Single stack and focal double stack  (+) focal transient purpura    Assessment and Plan     1. Hemangioma of face    2. Telangiectasias    3. Elective procedure for unacceptable cosmetic appearance        1. Hemangioma of face  improved  vbeam PDL laser treatment as outlined above. Patient educated to wear sunscreen of at least SPF 30 daily to face, neck, chest and other sun exposed areas    POST-OPERATIVE CARE/DISPOSITION: aquaphor and ice,  patient tolerated procedure well, left in stable condition  COMPLICATIONS: none  MEDICATIONS: none  WOUND CARE INSTRUCTIONS PROVIDED: yes, patient cooled treated areas with ice pack for 5 minutes before leaving, Instructed to ice the area for 5 minutes every hour for 5 hours at home    Discussed with patient that she will have redness, discoloration, swelling, and likely bruising and may take 2-3 weeks to resolve. She is to call the office if she experiences any adverse side effects that are concerning to her. F/u 4-6 weeks for recheck and possible Vbeam #4    2. telangectasias  See above. 3. Elective procedure for unacceptable cosmetic appearance      $200      Note is transcribed using voice recognition software. Inadvertent computerized transcription errors may be present.     Return in about 4 weeks (around 11/13/2020) for vbeam.

## 2020-11-20 ENCOUNTER — PROCEDURE VISIT (OUTPATIENT)
Dept: DERMATOLOGY | Age: 70
End: 2020-11-20
Payer: MEDICARE

## 2020-11-20 VITALS — TEMPERATURE: 97.3 F

## 2020-11-20 PROCEDURE — 4040F PNEUMOC VAC/ADMIN/RCVD: CPT | Performed by: DERMATOLOGY

## 2020-11-20 PROCEDURE — 3017F COLORECTAL CA SCREEN DOC REV: CPT | Performed by: DERMATOLOGY

## 2020-11-20 PROCEDURE — G8427 DOCREV CUR MEDS BY ELIG CLIN: HCPCS | Performed by: DERMATOLOGY

## 2020-11-20 PROCEDURE — 1036F TOBACCO NON-USER: CPT | Performed by: DERMATOLOGY

## 2020-11-20 PROCEDURE — G8417 CALC BMI ABV UP PARAM F/U: HCPCS | Performed by: DERMATOLOGY

## 2020-11-20 PROCEDURE — G8484 FLU IMMUNIZE NO ADMIN: HCPCS | Performed by: DERMATOLOGY

## 2020-11-20 PROCEDURE — 99212 OFFICE O/P EST SF 10 MIN: CPT | Performed by: DERMATOLOGY

## 2020-11-20 PROCEDURE — 1123F ACP DISCUSS/DSCN MKR DOCD: CPT | Performed by: DERMATOLOGY

## 2020-11-20 NOTE — PROGRESS NOTES
[facility]) Dermatology  [facility] [place]       Li Perez  1950    79 y.o. male     Date of Visit: 2020    Chief Complaint:   Chief Complaint   Patient presents with    Follow-up     post Vbeam series of 3. I was asked to see this patient by Dr. Trujillo ref. provider found. History of Present Illness:  Li Perez is a 79 y.o. male who presents with the chief complaint of follow up for the followin.  1 month follow-up for hemangioma on nose extending to cheek status post 3 Vbeam PDL treatments. Has noted significant improvement and is very satisfied with results. Would like the area evaluated today for my recommendations. Review of Systems:  Constitutional: Reports general sense of well-being   Skin: No new or changing moles, no tendency to develop thick scars, no interval of severe sunburns  Heme: No abnormal bruising or bleeding. Past Medical History, Family History, Surgical History, Medications and Allergies reviewed. Family History   Problem Relation Age of Onset    Cancer Mother     Cancer Father         skin- unsure of type    Heart Disease Father      Past Medical History:   Diagnosis Date    Hyperlipidemia     Hypertension     Seizure ([address]     last one 40 years ago     Past Surgical History:   Procedure Laterality Date    COLONOSCOPY  2012    polyp    HAND SURGERY Right 3955    hydraulic lift, partially severed hand, nerve and tendon repair.     JOINT REPLACEMENT Left     knee    JOINT REPLACEMENT Right     knee       No Known Allergies  Outpatient Medications Marked as Taking for the 20 encounter (Procedure visit) with Aggie Rodriguez, DO   Medication Sig Dispense Refill    rosuvastatin (CRESTOR) 10 MG tablet TAKE 1 TABLET BY MOUTH EVERY DAY 90 tablet 2    naproxen (NAPROSYN) 500 MG tablet TAKE 1 TABLET BY MOUTH TWICE A DAY WITH MEALS 180 tablet 2    dutasteride (AVODART) 0.5 MG capsule TAKE ONE CAPSULE BY MOUTH EVERY DAY 90 capsule 2    lisinopril-hydrochlorothiazide (PRINZIDE;ZESTORETIC) 10-12.5 MG per tablet TAKE 1 TABLET EVERY DAY 90 tablet 2    aspirin 81 MG EC tablet Take 81 mg by mouth daily. Social History:   Social History     Socioeconomic History    Marital status:      Spouse name: Hank Hanks Number of children: Not on file    Years of education: Not on file    Highest education level: Not on file   Occupational History    Not on file   Social Needs    Financial resource strain: Not on file    Food insecurity     Worry: Not on file     Inability: Not on file   [employer] needs     Medical: Not on file     Non-medical: Not on file   Tobacco Use    Smoking status: Former Smoker     Last attempt to quit: 2007     Years since quittin.8    Smokeless tobacco: Never Used   Substance and Sexual Activity    Alcohol use: Yes     Alcohol/week: 0.0 standard drinks     Comment: occasional- 1 beer monthly    Drug use: No    Sexual activity: Yes     Partners: Female   Lifestyle    Physical activity     Days per week: Not on file     Minutes per session: Not on file    Stress: Not on file   Relationships    Social connections     Talks on phone: Not on file     Gets together: Not on file     Attends Orthodoxy service: Not on file     Active member of club or organization: Not on file     Attends meetings of clubs or organizations: Not on file     Relationship status: Not on file    Intimate partner violence     Fear of current or ex partner: Not on file     Emotionally abused: Not on file     Physically abused: Not on file     Forced sexual activity: Not on file   Other Topics Concern    Not on file   Social History Narrative    Not on file       Physical Examination     The following were examined and determined to be normal: Psych/Neuro. The following were examined and determined to be abnormal: Head/face.      Bach phototype: 2    -Constitutional: Well appearing, no acute distress  -Neurological: Alert and oriented X 3  -Mood and Affect: Pleasant  Areas of skin examined as listed above:  1. Right nasal sidewall, nasal tip-scattered reddish-violaceous telangiectasias and few macules remaining of hemangioma. Right cheek-clear            Assessment and Plan     1. Hemangioma of skin        1. Hemangioma of skin  Patient very satisfied with results, has completed 3 Vbeam treatments and does not wish to proceed to a 4th. -Recommend at least once a year Vbeam PDL treatment for maintenance. Patient plans to call office and schedule as needed. Return to Clinic: at least once yearly for maintenance  Discussed plan with patient and/or primary caretaker. Patient to call clinic with any questions / concerns. Reviewed proper use and side effects of treatment(s) and/or medication(s) with patient and/or primary caretaker. AVS provided or is available on algrano     Note is transcribed using voice recognition software. Inadvertent computerized transcription errors may be present.

## 2020-11-20 NOTE — PATIENT INSTRUCTIONS
Sun Protection Tips    Apply broad spectrum water resistant sunscreen with an SPF of at least 30 to exposed areas of the skin. Dont forget the ears and lips! Remember to reapply sunscreen about every 2 hours and after swimming or sweating. Wear sun protective clothing. Swim shirts (aka. rash guards) are a great idea and negates the need to reapply sunscreen in those areas. Seek the shade whenever possible especially between the hours of 10am and 4pm when the suns rays are the strongest.     Avoid tanning beds          Wear a wide brim hat while in the sun        Following the procedure, the treated areas may be red or appear bruised and will likely be swollen for a few hours or up to a few days. If brown spots were treated today, expect that they will darken first for about one week before shedding off. The affected areas should be treated delicately following treatment. Discomfort and swelling should be treated with the application of hourly cool compresses the evening of the procedure. Avoid applying ice directly to the skin. If your face was treated, you may want to sleep with your head elevated on an extra pillow to help minimize swelling. Use Aquaphor daily as needed to treated areas. Multiple consecutive treatments may be needed to achieve desired outcome or significant improvement. Wear sunscreen daily. Avoid extra aspirin, ibuprofen, vitamin E following the procedure - this may increase your chance of bruising. Improper care of the treated area while the discoloration is present may increase the chance of scarring, hypo- or hyper-pigmentation, or skin textural changes to the treated area. Please call the office if you have excessive discomfort, herpes simplex virus flare, or burns, blisters, or scabbing. Thanks for your visit! Feel free to send  Dr. Mario Brand assistantVenus, a [employer] message for any questions, concerns or  to schedule your cosmetic procedures.

## 2021-02-02 ENCOUNTER — OFFICE VISIT (OUTPATIENT)
Dept: DERMATOLOGY | Age: 71
End: 2021-02-02

## 2021-02-02 VITALS — TEMPERATURE: 98.4 F

## 2021-02-02 DIAGNOSIS — L91.8 SKIN TAGS, MULTIPLE ACQUIRED: Primary | ICD-10-CM

## 2021-02-02 PROCEDURE — MISCTAG14 COSMETIC REMOVAL OF BENIGN TAGS UP TO 14: Performed by: DERMATOLOGY

## 2021-02-02 RX ORDER — TRAMADOL HYDROCHLORIDE 50 MG/1
1 TABLET ORAL
COMMUNITY
Start: 2020-11-02

## 2021-02-02 RX ORDER — METHOCARBAMOL 750 MG/1
750 TABLET, FILM COATED ORAL 4 TIMES DAILY
COMMUNITY
Start: 2020-12-23 | End: 2021-03-23

## 2021-02-02 NOTE — PATIENT INSTRUCTIONS
Sun Protection Tips    Apply broad spectrum water resistant sunscreen with an SPF of at least 30 to exposed areas of the skin. Dont forget the ears and lips! Remember to reapply sunscreen about every 2 hours and after swimming or sweating. Wear sun protective clothing. Swim shirts (aka. rash guards) are a great idea and negates the need to reapply sunscreen in those areas. Seek the shade whenever possible especially between the hours of 10am and 4pm when the suns rays are the strongest.     Avoid tanning beds          Wear a wide brim hat while in the sun        Wound Care Instructions  ? Cleanse the wound with mild soapy water daily. ? Gently dry the area. ? Apply Vaseline or petroleum jelly to the wound using a cotton tipped applicator or Qtip. ? Cover with a clean bandage. ? Repeat this process until the biopsy site is healed. ? You may shower and bathe as usual.       Thanks for your visit! Feel free to send  Dr. Nicci Bedoya assistant, Venus, a [employer] message/call for any questions, concerns or  to schedule your cosmetic procedures.

## 2021-02-02 NOTE — PROGRESS NOTES
[facility]) Dermatology  Cholo WebbLorena [address]       [id number] [facility]  1950    79 y.o. male     Date of Visit: 2021    Chief Complaint:   Chief Complaint   Patient presents with    Skin Lesion     neck, axillas        I was asked to see this patient by Dr. Trujillo ref. provider found. History of Present Illness:  [id number] [address] [place] Kathryns Jaspreet is a 79 y.o. male who presents with the chief complaint of the followin. Presents today for cosmetic cryotherapy and snip removal of skin tags to right axilla, left axilla, lateral sides of neck as patient finds these tags cosmetically unappealing. Patient verbalizes understanding of out-of-pocket associated fever treatment. No concerns prior to procedure. Review of Systems:  Constitutional: Reports general sense of well-being   Skin: No new or changing moles, no tendency to develop thick scars, no interval of severe sunburns  Heme: No abnormal bruising or bleeding. Past Skin Hx:   Patient denies past history of melanoma, NMSC, dysplastic nevi    PFHx: Denies hx of MM or NMSC    ADDITIONAL HISTORY:    I have reviewed past medical and surgical histories, current medications, allergies, social and family histories as documented in the patient's electronic medical record. Family History   Problem Relation Age of Onset    Cancer Mother     Cancer Father         skin- unsure of type    Heart Disease Father      Past Medical History:   Diagnosis Date    Hyperlipidemia     Hypertension     Seizure ([facility] [address]     last one 40 years ago     Past Surgical History:   Procedure Laterality Date    COLONOSCOPY  2012    polyp    HAND SURGERY Right 1540    hydraulic lift, partially severed hand, nerve and tendon repair.     JOINT REPLACEMENT Left     knee    JOINT REPLACEMENT Right     knee       No Known Allergies  Outpatient Medications Marked as Taking for the 21 encounter (Office Visit) with Cholo Webb DO Emotionally abused: Not on file     Physically abused: Not on file     Forced sexual activity: Not on file   Other Topics Concern    Not on file   Social History Narrative    Not on file       Physical Examination     The following were examined and determined to be normal: Psych/Neuro and Neck and axillas    The following were examined and determined to be abnormal: none. Bach phototype: 2    -Constitutional: Well appearing, no acute distress  -Neurological: Alert and oriented X 3  -Mood and Affect: Pleasant  Areas of skin examined as listed above:   1. pedunculated skin-colored and faint brownish-pink soft papules located to left axilla (6), right axilla (4), left lateral neck (1) and right lateral neck (3)    Assessment and Plan     1. Skin tags, multiple acquired        1. Skin tags, multiple acquired  -Patient educated about the benign nature of skin tags. No treatment is necessary. However, patient requests cosmetic removal. With this information, patient requested removal of skin tags for cosmetic reasons.  -Discussed options for cosmetic removal and associated out of pocket fee.     $85 for up to 14 treated with cryotherapy or snip removal, each additional 10 treated at today's visit is $20. Educated about risk of hypo- or hyper-pigmenation and scarring, pain, bleeding, etc.   -Patient designated skin tags to be treated at today's visit. -Verbal and written consent obtained after risks (infection, bleeding, scar), benefits and alternatives explained.    -Snip excision performed of 8 lesion(s).  Pt educated re: risk of bleeding, infection, scar and wound care 6 lesion(s) treated w/ 1 cycle(s) of liquid nitrogen for 3 seconds. The patient's consent was obtained and the patient was educated regarding the potential risks of blister formation, discomfort, darker or lighter pigmentary change, crusting, scar, blistering, swelling, infection, possible need for multiple treatments, risk of recurrence. Wound care was discussed. $85      Return to Clinic: PRN cosmetic tag removal  Discussed plan with patient and/or primary caretaker. Patient to call clinic with any questions / concerns. Reviewed proper use and side effects of treatment(s) and/or medication(s) with patient and/or primary caretaker. AVS provided or is available on [facility]     Note is transcribed using voice recognition software. Inadvertent computerized transcription errors may be present.

## 2021-07-26 NOTE — PATIENT INSTRUCTIONS
Sun Protection Tips    Apply broad spectrum water resistant sunscreen with an SPF of at least 30 to exposed areas of the skin. Dont forget the ears and lips! Remember to reapply sunscreen about every 2 hours and after swimming or sweating. Wear sun protective clothing. Swim shirts (aka. rash guards) are a great idea and negates the need to reapply sunscreen in those areas. Seek the shade whenever possible especially between the hours of 10am and 4pm when the suns rays are the strongest.     Avoid tanning beds          Wear a wide brim hat while in the sun    Biopsy Wound Care Instructions   Cleanse the wound twice a day with mild soapy water.  Gently dry the area.  Apply Vaseline/Aquaphor to the wound using a cotton tipped applicator or Qtip.  Cover with a clean bandage.  Repeat this process until the biopsy site is healed. You will know when it's healed when it's pink and shiny.  You may shower and bathe as usual.      If bleeding should occur, apply firm pressure to bleeding area for 15 minutes and repeat if needed. If bleeding continues after 30 minutes, please call office and ask to speak to Venus.        ** Biopsy results generally take around 7-10  business days to come back. A member of Dr. [facility] staff will call you when the results are have been reviewed and a personalized treatment plan has been established. If you don't hear from our staff after the 10 business days, please call our office for your results. Cryosurgery (Freezing) Wound Care Instructions    AFTER THE PROCEDURE:    You will notice swelling and redness around the site. This is normal.    You may experience a sharp or sore feeling for the next several days. For this discomfort, you may take acetaminophen (Tylenol©).  A blister may develop at the treated area, sometimes as soon as by the end of the day. After several days, the blister will subside and a scab will form.     If the area is bumped or traumatized during the first few days following freezing, you may develop bleeding into the blister, forming a blood blister. This is nothing to be alarmed about.  If the blister is tense, uncomfortable, or much larger than the site that was frozen, you may pop the blister along its edge with a sterile needle (boiled, heated under a flame, or cleaned with alcohol) to allow the fluid to drain out. If the blister does not bother you, no treatment is needed.  Do NOT peel off the top of the blister roof. It will act as a dressing on top of your wound. WOUND CARE:    You may shower or bathe as usual, but avoid scrubbing the areas that have been frozen.  Cleanse the site twice a day with mild soapy water, and then apply a thin film of white petrolatum (Vaseline©).  You do not need to cover the area, but can if you prefer.  Do NOT allow the site to become dry or crusted, or attempt to dry it out with rubbing alcohol or hydrogen peroxide.  Continue this regimen until the area is pink and healed. Depending on the size and location of your cryosurgery site, healing may take 2 to 4 weeks.  The area may continue to be pink for several weeks, and over the next few months may become darker or lighter than the surrounding skin. This may be a permanent change. Thanks for your visit! Feel free to call/[employer] message  Dr. Jackson Champ Venus ramos if you have questions, concerns or to schedule your cosmetic procedures.

## 2021-07-27 ENCOUNTER — OFFICE VISIT (OUTPATIENT)
Dept: DERMATOLOGY | Age: 71
End: 2021-07-27
Payer: MEDICARE

## 2021-07-27 VITALS — TEMPERATURE: 99 F

## 2021-07-27 DIAGNOSIS — L57.0 ACTINIC KERATOSES: ICD-10-CM

## 2021-07-27 DIAGNOSIS — L82.1 SEBORRHEIC KERATOSIS: ICD-10-CM

## 2021-07-27 DIAGNOSIS — L81.4 SOLAR LENTIGINOSIS: ICD-10-CM

## 2021-07-27 DIAGNOSIS — D22.9 MULTIPLE BENIGN NEVI: ICD-10-CM

## 2021-07-27 DIAGNOSIS — D48.9 NEOPLASM OF UNCERTAIN BEHAVIOR: Primary | ICD-10-CM

## 2021-07-27 PROCEDURE — 11102 TANGNTL BX SKIN SINGLE LES: CPT | Performed by: DERMATOLOGY

## 2021-07-27 PROCEDURE — 17000 DESTRUCT PREMALG LESION: CPT | Performed by: DERMATOLOGY

## 2021-07-27 PROCEDURE — G8421 BMI NOT CALCULATED: HCPCS | Performed by: DERMATOLOGY

## 2021-07-27 PROCEDURE — 99213 OFFICE O/P EST LOW 20 MIN: CPT | Performed by: DERMATOLOGY

## 2021-07-27 PROCEDURE — G8427 DOCREV CUR MEDS BY ELIG CLIN: HCPCS | Performed by: DERMATOLOGY

## 2021-07-27 PROCEDURE — 4040F PNEUMOC VAC/ADMIN/RCVD: CPT | Performed by: DERMATOLOGY

## 2021-07-27 PROCEDURE — 3017F COLORECTAL CA SCREEN DOC REV: CPT | Performed by: DERMATOLOGY

## 2021-07-27 PROCEDURE — 1036F TOBACCO NON-USER: CPT | Performed by: DERMATOLOGY

## 2021-07-27 PROCEDURE — 17003 DESTRUCT PREMALG LES 2-14: CPT | Performed by: DERMATOLOGY

## 2021-07-27 PROCEDURE — 1123F ACP DISCUSS/DSCN MKR DOCD: CPT | Performed by: DERMATOLOGY

## 2021-07-27 RX ORDER — METHOCARBAMOL 750 MG/1
750 TABLET, FILM COATED ORAL 4 TIMES DAILY
COMMUNITY
Start: 2021-07-05 | End: 2021-10-03

## 2021-07-27 RX ORDER — TRAZODONE HYDROCHLORIDE 50 MG/1
50 TABLET ORAL NIGHTLY PRN
COMMUNITY
Start: 2021-04-27

## 2021-07-27 RX ORDER — FLUTICASONE PROPIONATE 50 MCG
2 SPRAY, SUSPENSION (ML) NASAL DAILY
COMMUNITY
Start: 2021-05-17

## 2021-07-27 NOTE — PROGRESS NOTES
[facility]) Dermatology  [place], [place], [address]       Nolvia Cohen  1950    70 y.o. male     Date of Visit: 2021    Chief Complaint:   Chief Complaint   Patient presents with    Skin Lesion     lt thight rt arm tbse        I was asked to see this patient by Dr. Trujillo ref. provider found. History of Present Illness:  Nolvia Cohen is a 70 y.o. male who presents with the chief complaint of the followin. Total-body skin exam for spots. Many year history of multiple nevi on the head/neck, trunk and extremities, all present for many years. Denies new moles. Denies moles changing in size, shape, color. None associated w/ pain, bleeding, pruritus. 2.  History of actinic keratoses to left preauricular cheek and right preauricular cheek status post cryotherapy in 2020 at last skin exam.  Patient denies recurrence. Unsure if he has any AKs. 3.  Complains of a raised rough feeling growth to left posterior thigh for months. he denies associated burning, bleeding, pain, or itch. 4.  Complains of a new dry spot to right dorsal forearm for months. 5.Chronic History of photoaging of skin/lentigines to sun exposed areas on head/neck/torso/extremities over several years. Denies changes in size, shape, or color. Admits to sun exposure in youth without wearing sunscreen, hats, or protective clothing.  Denies regularly wearing sunscreen or protective hats/clothing when outdoors currently    Review of Systems:  Constitutional: Reports general sense of well-being   Skin: No new or changing moles, no tendency to develop thick scars, no interval of severe sunburns  Heme: No abnormal bruising or bleeding.       Past Skin Hx:  -2021-history of cosmetic treatment of acrochordons to bilateral axillas and lateral sides of neck  -Hemangioma on nose-status post 3 Vbeam PDL treatments (last treatment 2020), status post phototherapy treatment by ENT surgeon several years ago with modest improvement  -12/2019-history of pilar cyst to occipital scalp status post surgical excision  -7/2018-inflamed epidermoid cyst located to left medial mid back status post intralesional Kenalog 10 mg/mL injection, I&D and oral doxycycline 100 mg p.o. twice daily for 10 days-resolved  -History of actinic keratosis to right dorsal hand status post cryotherapy  -History of photoaging of skin/lentigines  Patient denies past history of melanoma, NMSC, dysplastic nevi    PFHx: Father history of skin cancer-unsure what type    ADDITIONAL HISTORY:    I have reviewed past medical and surgical histories, current medications, allergies, social and family histories as documented in the patient's electronic medical record. Family History   Problem Relation Age of Onset    Cancer Mother     Cancer Father         skin- unsure of type    Heart Disease Father      Past Medical History:   Diagnosis Date    Hyperlipidemia     Hypertension     Seizure ([address]     last one 40 years ago     Past Surgical History:   Procedure Laterality Date    COLONOSCOPY  07/2012    polyp    HAND SURGERY Right 6434    hydraulic lift, partially severed hand, nerve and tendon repair.  JOINT REPLACEMENT Left 2008    knee    JOINT REPLACEMENT Right 2007    knee       No Known Allergies  Outpatient Medications Marked as Taking for the 7/27/21 encounter (Office Visit) with Cristal Pantoja, DO   Medication Sig Dispense Refill    fluticasone (FLONASE) 50 MCG/ACT nasal spray 2 sprays by Nasal route daily      methocarbamol (ROBAXIN) 750 MG tablet Take 750 mg by mouth 4 times daily      traZODone (DESYREL) 50 MG tablet Take 50 mg by mouth nightly as needed      traMADol (ULTRAM) 50 MG tablet Take 1 tablet by mouth.       rosuvastatin (CRESTOR) 10 MG tablet TAKE 1 TABLET BY MOUTH EVERY DAY 90 tablet 2    naproxen (NAPROSYN) 500 MG tablet TAKE 1 TABLET BY MOUTH TWICE A DAY WITH MEALS 180 tablet 2    dutasteride (AVODART) 0.5 MG capsule TAKE ONE CAPSULE BY MOUTH EVERY DAY 90 capsule 2    lisinopril-hydrochlorothiazide (PRINZIDE;ZESTORETIC) 10-12.5 MG per tablet TAKE 1 TABLET EVERY DAY 90 tablet 2    aspirin 81 MG EC tablet Take 81 mg by mouth daily. Social History:   Social History     Socioeconomic History    Marital status:      Spouse name: Bia Chain Number of children: Not on file    Years of education: Not on file    Highest education level: Not on file   Occupational History    Not on file   Tobacco Use    Smoking status: Former Smoker     Quit date: 2007     Years since quittin.5    Smokeless tobacco: Never Used   Vaping Use    Vaping Use: Never used   Substance and Sexual Activity    Alcohol use: Yes     Alcohol/week: 0.0 standard drinks     Comment: occasional- 1 beer monthly    Drug use: No    Sexual activity: Yes     Partners: Female   Other Topics Concern    Not on file   Social History Narrative    Not on file     Social Determinants of Health     Financial Resource Strain:     Difficulty of Paying Living Expenses:    Food Insecurity:     Worried About Running Out of Food in the Last Year:     [address] in the Last Year:    Transportation Needs:     Lack of Transportation (Medical):      Lack of Transportation (Non-Medical):    Physical Activity:     Days of Exercise per Week:     Minutes of Exercise per Session:    Stress:     Feeling of Stress :    Social Connections:     Frequency of Communication with Friends and Family:     Frequency of Social Gatherings with Friends and Family:     Attends Church Services:     Active Member of Clubs or Organizations:     Attends Club or Organization Meetings:     Marital Status:    Intimate Partner Violence:     Fear of Current or Ex-Partner:     Emotionally Abused:     Physically Abused:     Sexually Abused:        Physical Examination     The following were examined and determined to be normal: Psych/Neuro, Scalp/hair, Conjunctivae/eyelids, Gums/teeth/lips, Neck, Breast/axilla/chest, Abdomen, Back, RUE, RLE, LLE and Nails/digits. buttocks. Areas covered by underwear garment(s) not examined. The following were examined and determined to be abnormal: Head/face and LUE. Bach phototype: 2    -Constitutional: Well appearing, no acute distress  -Neurological: Alert and oriented X 3  -Mood and Affect: Pleasant  Total body skin exam performed, areas examined listed above:   1. L jawline- 0.5x0.3cm irregularly bordered asymmetrical brown macule with ill defined features on dermoscopy      2.  ill defined irreg shaped gritty keratotic pink macule(s) located to left temple (2), left lateral cheek, tragus of R ear, right dorsal forearm (2)  3. Left posterior thigh- stuck-on appearing tan-brown verrucous papule  4. Scattered on the head,neck, trunk and extremities are multiple well-defined round and oval symmetric smoothly-bordered uniformly brown macules and papules; no bleeding nevi. 5. several discrete and coalescing few 2-4 mm round uniformly brown macules scattered to face, neck, and sun exposed areas on torso and extremities    Assessment and Plan     1. Neoplasm of uncertain behavior    2. Actinic keratoses    3. Seborrheic keratosis    4. Multiple benign nevi    5. Solar lentiginosis        1. Neoplasm of uncertain behavior  DDx: SK v. Lentigo rule out lentigo maligna melanoma  -Discussed possible diagnosis. Patient agreeable to biopsy. Verbal consent obtained after risks (infection, bleeding, scar, dyspigmentation), benefits and alternatives explained. -Area(s) to be biopsied were marked with a surgical pen. Site(s) were cleansed with alcohol. Local anesthesia achieved with 1% lidocaine with epinephrine/sodium bicarbonate. Shave biopsy performed with a razor blade. Hemostasis was achieved with aluminum chloride and electrodesiccation. The wound(s) were dressed with petrolatum and covered with a bandage.  Specimen(s) sent to pathology. Pt educated re: risk of bleeding, infection, scar, discoloration, and wound care instructions. 2. Actinic keratoses  -Edu re: relationship with chronic cumulative sun exposure, low premalignant potential.   Verbal consent obtained. - left temple (2), left lateral cheek, tragus of R ear, right dorsal forearm (2), 6 lesion(s) treated w/ liquid nitrogen x 1cycles, 3 seconds each located    Edu re: risk of blister formation, discomfort, scar, dyspigmentation. Discussed wound care. -Reviewed sun protective behavior -- sun avoidance during the peak hours of the day, sun-protective clothing (including hat and sunglasses), sunscreen use (water resistant, broad spectrum, SPF at least 30, need for reapplication every 2 to 3 hours). -Patient to contact office if AK fails to resolve despite treatment or concern for recurrence (discussed signs/symptoms to monitor for) or if patient develops side effect from therapy, such as unbearable blister, crusting, scabbing, redness, or tenderness. 3. Seborrheic keratosis  -Reassurance provided to the patient regarding their chronic and benign nature. No treatment performed      4. Multiple benign nevi  Benign acquired melanocytic nevi  -Recommend monthly self skin exams   -Educated regarding the ABCDEs of melanoma detection   -Call for any new/changing moles or concerning lesions  -Reviewed sun protective behavior -- sun avoidance during the peak hours of the day, sun-protective clothing (including hat and sunglasses), sunscreen use (water resistant, broad spectrum, SPF at least 30, need for reapplication every 1.5 to 2 hours), avoidance of tanning beds   -Plan: Observation with annual skin checks (earlier if indicated) performed in office to monitor current nevi and to assess for new lesions.       5. Solar lentiginosis  Solar lentigines  -Edu re: benignity, relationship w/ chronic cumulative sun exposure, darkening w/ unprotected sun exposure  -Reviewed sun protective behavior -- sun avoidance during the peak hours of the day, sun-protective clothing (including hat and sunglasses), sunscreen use (water resistant, broad spectrum, SPF at least 30, need for reapplication every 2 to 3 hours), avoidance of tanning beds   Observation, pt to call if changes in size, shape, color or experiences bleeding/pain/itching            Return to Clinic:  1 year skin exam  Discussed plan with patient and/or primary caretaker. Patient to call clinic with any questions / concerns. Reviewed proper use and side effects of treatment(s) and/or medication(s) with patient and/or primary caretaker. AVS provided or is available on [facility]     Note is transcribed using voice recognition software. Inadvertent computerized transcription errors may be present.

## 2022-07-27 ENCOUNTER — HOSPITAL ENCOUNTER (OUTPATIENT)
Age: 72
Setting detail: OUTPATIENT SURGERY
Discharge: HOME | End: 2022-07-27
Attending: INTERNAL MEDICINE | Admitting: INTERNAL MEDICINE
Payer: MEDICARE

## 2022-07-27 ENCOUNTER — ANESTHESIA (OUTPATIENT)
Dept: ENDOSCOPY | Age: 72
End: 2022-07-27
Payer: MEDICARE

## 2022-07-27 ENCOUNTER — ANESTHESIA EVENT (OUTPATIENT)
Dept: ENDOSCOPY | Age: 72
End: 2022-07-27
Payer: MEDICARE

## 2022-07-27 VITALS
DIASTOLIC BLOOD PRESSURE: 79 MMHG | HEIGHT: 72 IN | BODY MASS INDEX: 39.96 KG/M2 | HEART RATE: 71 BPM | RESPIRATION RATE: 16 BRPM | WEIGHT: 295 LBS | TEMPERATURE: 99 F | OXYGEN SATURATION: 96 % | SYSTOLIC BLOOD PRESSURE: 125 MMHG

## 2022-07-27 DIAGNOSIS — K62.5 RECTAL BLEEDING: ICD-10-CM

## 2022-07-27 PROCEDURE — 2500000003 HC RX 250 WO HCPCS: Performed by: INTERNAL MEDICINE

## 2022-07-27 PROCEDURE — 3700000000 HC ANESTHESIA ATTENDED CARE: Performed by: INTERNAL MEDICINE

## 2022-07-27 PROCEDURE — 7100000011 HC PHASE II RECOVERY - ADDTL 15 MIN: Performed by: INTERNAL MEDICINE

## 2022-07-27 PROCEDURE — 7100000010 HC PHASE II RECOVERY - FIRST 15 MIN: Performed by: INTERNAL MEDICINE

## 2022-07-27 PROCEDURE — 2580000003 HC RX 258

## 2022-07-27 PROCEDURE — 2709999900 HC NON-CHARGEABLE SUPPLY: Performed by: INTERNAL MEDICINE

## 2022-07-27 PROCEDURE — 3700000001 HC ADD 15 MINUTES (ANESTHESIA): Performed by: INTERNAL MEDICINE

## 2022-07-27 PROCEDURE — 3609010600 HC COLONOSCOPY POLYPECTOMY SNARE/COLD BIOPSY: Performed by: INTERNAL MEDICINE

## 2022-07-27 PROCEDURE — 6360000002 HC RX W HCPCS

## 2022-07-27 PROCEDURE — 88305 TISSUE EXAM BY PATHOLOGIST: CPT

## 2022-07-27 RX ORDER — SODIUM CHLORIDE, SODIUM LACTATE, POTASSIUM CHLORIDE, CALCIUM CHLORIDE 600; 310; 30; 20 MG/100ML; MG/100ML; MG/100ML; MG/100ML
INJECTION, SOLUTION INTRAVENOUS CONTINUOUS PRN
Status: DISCONTINUED | OUTPATIENT
Start: 2022-07-27 | End: 2022-07-27 | Stop reason: SDUPTHER

## 2022-07-27 RX ORDER — SODIUM CHLORIDE 0.9 % (FLUSH) 0.9 %
5-40 SYRINGE (ML) INJECTION PRN
Status: DISCONTINUED | OUTPATIENT
Start: 2022-07-27 | End: 2022-07-27 | Stop reason: HOSPADM

## 2022-07-27 RX ORDER — PROPOFOL 10 MG/ML
INJECTION, EMULSION INTRAVENOUS CONTINUOUS PRN
Status: DISCONTINUED | OUTPATIENT
Start: 2022-07-27 | End: 2022-07-27 | Stop reason: SDUPTHER

## 2022-07-27 RX ORDER — PROPOFOL 10 MG/ML
INJECTION, EMULSION INTRAVENOUS PRN
Status: DISCONTINUED | OUTPATIENT
Start: 2022-07-27 | End: 2022-07-27 | Stop reason: SDUPTHER

## 2022-07-27 RX ORDER — LIDOCAINE HYDROCHLORIDE 10 MG/ML
1 INJECTION, SOLUTION EPIDURAL; INFILTRATION; INTRACAUDAL; PERINEURAL
Status: DISCONTINUED | OUTPATIENT
Start: 2022-07-27 | End: 2022-07-27 | Stop reason: HOSPADM

## 2022-07-27 RX ORDER — LIDOCAINE HYDROCHLORIDE 20 MG/ML
INJECTION, SOLUTION INTRAVENOUS PRN
Status: DISCONTINUED | OUTPATIENT
Start: 2022-07-27 | End: 2022-07-27 | Stop reason: SDUPTHER

## 2022-07-27 RX ORDER — SODIUM CHLORIDE 9 MG/ML
INJECTION, SOLUTION INTRAVENOUS PRN
Status: DISCONTINUED | OUTPATIENT
Start: 2022-07-27 | End: 2022-07-27 | Stop reason: HOSPADM

## 2022-07-27 RX ORDER — SODIUM CHLORIDE 0.9 % (FLUSH) 0.9 %
5-40 SYRINGE (ML) INJECTION EVERY 12 HOURS SCHEDULED
Status: DISCONTINUED | OUTPATIENT
Start: 2022-07-27 | End: 2022-07-27 | Stop reason: HOSPADM

## 2022-07-27 RX ORDER — SODIUM CHLORIDE, SODIUM LACTATE, POTASSIUM CHLORIDE, CALCIUM CHLORIDE 600; 310; 30; 20 MG/100ML; MG/100ML; MG/100ML; MG/100ML
INJECTION, SOLUTION INTRAVENOUS CONTINUOUS
Status: DISCONTINUED | OUTPATIENT
Start: 2022-07-27 | End: 2022-07-27 | Stop reason: HOSPADM

## 2022-07-27 RX ADMIN — LIDOCAINE HYDROCHLORIDE 100 MG: 20 INJECTION, SOLUTION INTRAVENOUS at 09:55

## 2022-07-27 RX ADMIN — PROPOFOL 150 MCG/KG/MIN: 10 INJECTION, EMULSION INTRAVENOUS at 09:55

## 2022-07-27 RX ADMIN — PROPOFOL 50 MG: 10 INJECTION, EMULSION INTRAVENOUS at 09:55

## 2022-07-27 RX ADMIN — SODIUM CHLORIDE, SODIUM LACTATE, POTASSIUM CHLORIDE, AND CALCIUM CHLORIDE: .6; .31; .03; .02 INJECTION, SOLUTION INTRAVENOUS at 09:43

## 2022-07-27 ASSESSMENT — PAIN - FUNCTIONAL ASSESSMENT: PAIN_FUNCTIONAL_ASSESSMENT: NONE - DENIES PAIN

## 2022-07-27 ASSESSMENT — LIFESTYLE VARIABLES: SMOKING_STATUS: 0

## 2022-07-27 NOTE — ANESTHESIA PRE PROCEDURE
and tendon repair.  JOINT REPLACEMENT Left 2008    knee    JOINT REPLACEMENT Right 2007    knee       Social History:    Social History     Tobacco Use    Smoking status: Former     Types: Cigarettes     Quit date: 1/2/2007     Years since quitting: 15.5    Smokeless tobacco: Never   Substance Use Topics    Alcohol use: Yes     Alcohol/week: 0.0 standard drinks     Comment: occasional- 1 beer monthly                                Counseling given: Not Answered      Vital Signs (Current):   Vitals:    07/27/22 0717   BP: 127/78   Pulse: 80   Resp: 16   Temp: 97.9 °F (36.6 °C)   TempSrc: Temporal   SpO2: 99%   Weight: 295 lb (133.8 kg)   Height: 6' (1.829 m)                                              BP Readings from Last 3 Encounters:   07/27/22 127/78   12/05/19 109/67   02/23/18 116/70       NPO Status:                                                                                 BMI:   Wt Readings from Last 3 Encounters:   07/27/22 295 lb (133.8 kg)   12/05/19 (!) 314 lb 9.6 oz (142.7 kg)   02/23/18 (!) 327 lb 6.4 oz (148.5 kg)     Body mass index is 40.01 kg/m². CBC: No results found for: WBC, RBC, HGB, HCT, MCV, RDW, PLT    CMP:   Lab Results   Component Value Date/Time     11/10/2017 11:59 AM    K 4.3 11/10/2017 11:59 AM    CL 98 11/10/2017 11:59 AM    CO2 23 11/10/2017 11:59 AM    BUN 21 11/10/2017 11:59 AM    CREATININE 1.0 11/10/2017 11:59 AM    GFRAA >60 11/10/2017 11:59 AM    AGRATIO 1.7 12/28/2012 09:44 AM    LABGLOM >60 11/10/2017 11:59 AM    GLUCOSE 87 11/10/2017 11:59 AM    PROT 6.7 11/10/2017 11:59 AM    PROT 6.8 12/28/2012 09:44 AM    CALCIUM 9.3 11/10/2017 11:59 AM    BILITOT 0.4 11/10/2017 11:59 AM    ALKPHOS 69 11/10/2017 11:59 AM    AST 19 11/10/2017 11:59 AM    ALT 18 11/10/2017 11:59 AM       POC Tests: No results for input(s): POCGLU, POCNA, POCK, POCCL, POCBUN, POCHEMO, POCHCT in the last 72 hours.     Coags: No results found for: PROTIME, INR, APTT    HCG (If Applicable): No results found for: PREGTESTUR, PREGSERUM, HCG, HCGQUANT     ABGs: No results found for: PHART, PO2ART, SKU4CYI, IZN4QBG, BEART, Z5JCGIUQ     Type & Screen (If Applicable):  No results found for: LABABO, LABRH    Drug/Infectious Status (If Applicable):  No results found for: HIV, HEPCAB    COVID-19 Screening (If Applicable): No results found for: COVID19        Anesthesia Evaluation    Airway: Mallampati: III  TM distance: >3 FB   Neck ROM: full  Mouth opening: > = 3 FB   Dental: normal exam     Comment: Lower lip left vermilion border herpes simplex    Pulmonary: breath sounds clear to auscultation      (-) COPD, asthma, sleep apnea (questionable) and not a current smoker                           Cardiovascular:    (+) hypertension:, hyperlipidemia    (-) past MI, CAD, CABG/stent, dysrhythmias,  angina and  CHF      Rhythm: regular  Rate: normal                    Neuro/Psych:   (+) seizures (last seizure 40 years ago):,    (-) TIA and CVA           GI/Hepatic/Renal:   (+) morbid obesity     (-) GERD, hepatitis and liver disease       Endo/Other:    (+) no malignancy/cancer. (-) diabetes mellitus, hypothyroidism, hyperthyroidism, no malignancy/cancer               Abdominal:             Vascular:     - DVT and PE. Other Findings:           Anesthesia Plan      MAC     ASA 3       Induction: intravenous. Anesthetic plan and risks discussed with patient. Plan discussed with CRNA.                     Grayson Hinojosa MD   7/27/2022

## 2022-07-27 NOTE — DISCHARGE INSTRUCTIONS
ENDOSCOPY DISCHARGE INSTRUCTIONS:    Call the physician that did your procedure for any questions or concerns:           DR. Peña Sol:  [phone]               ACTIVITY:    There are potential side effects from the medications used for sedation and anesthesia during your procedure. These include:  Dizziness or light-headedness, confusion or memory loss, delayed reaction times, loss of coordination, nausea and vomiting. Because of your increased risk for injury, we ask that you observe the following precautions: For the next 24 hours,  DO NOT operate an automobile, bicycle, motorcycle, , power tools or large equipment of any kind. Do not drink alcohol, sign any legal documents or make any legal decisions for 24 hours. Do not bend your head over lower than your heart. DO sit on the side of bed/couch awhile before getting up. Plan on bedrest or quiet relaxation today. You may resume normal activities in 24 hours. DIET:    Your first meal today should be light, avoiding spicy and fatty foods. If you tolerate this first meal, then you may advance to your regular diet unless otherwise advised by your physician. NORMAL SYMPTOMS:  -Mild sore throat if youve had an EGD   -Gaseous discomfort if you've had an EGD or Colonoscopy. NOTIFY YOUR PHYSICIAN IF THESE SYMPTOMS OCCUR:  1. Fever (greater than 100)  5. Increased abdominal bloating  2. Severe pain    6. Excessive bleeding  3. Nausea and vomiting  7. Chest pain                                                                    4. Chills    8. Shortness of breath      ADDITIONAL INSTRUCTIONS:    Biopsy results: To be discussed at your follow-up visit. Educational Information:         Colon Polyps: Care Instructions  Your Care Instructions     Colon polyps are growths in the colon or the rectum. The cause of most colon polyps is not known, and most people who get them do not have any problems. But a certain kind can turn into cancer.  For this You have severe belly pain. Your stools are maroon or very bloody. Watch closely for changes in your health, and be sure to contact your doctor if:    You have a fever. You have nausea or vomiting. You have a change in bowel habits (new constipation or diarrhea). Your symptoms get worse or are not improving as expected. Where can you learn more? Go to [email or web address]://[email or web address].iopepiceweb.[employer]. org and sign in to your [employer] account. Enter [id number] in the [facility] box to learn more about \"Colon Polyps: Care Instructions. \"     If you do not have an account, please click on the \"Sign Up Now\" link. Current as of: September 8, 2021               Content Version: 13.3  © 2006-2022 [employer], [employer]. Care instructions adapted under license by [facility] ([facility]). If you have questions about a medical condition or this instruction, always ask your healthcare professional. [place] [address] any warranty or liability for your use of this information. Follow up: Schedule an appointment with Dr. Petey Packer for two weeks from now if you have not already done so. Please review these discharge instructions this evening or tomorrow for  information you may have forgotten. We want to thank you for choosing the [facility] as your health care provider. We always strive to provide you with excellent care while you are here. You may receive a survey in the mail regarding your care. We would appreciate you taking a few minutes of your time to complete this survey.  Again, thank you for choosing the [facility]

## 2022-07-27 NOTE — ANESTHESIA POSTPROCEDURE EVALUATION
Department of Anesthesiology  Postprocedure Note    Patient: Seth Schwab  MRN: [id number]  YOB: 1950  Date of evaluation: 7/27/2022      Procedure Summary     Date: 07/27/22 Room / Location: [address] [place] [address] [address] / [facility]    Anesthesia Start: 9671 Anesthesia Stop: 6022    Procedure: COLONOSCOPY POLYPECTOMY SNARE/COLD BIOPSY Diagnosis:       Rectal bleeding      (Rectal bleeding [K62.5])    Surgeons: Varinder Christine MD Responsible Provider: Jennifer Cardoso MD    Anesthesia Type: MAC ASA Status: 3          Anesthesia Type: No value filed.     Antonio Phase I: Antonio Score: 10    Antonio Phase II: Antonio Score: 10      Anesthesia Post Evaluation    Patient location during evaluation: bedside  Level of consciousness: awake and alert  Airway patency: patent  Nausea & Vomiting: no vomiting  Complications: no  Cardiovascular status: blood pressure returned to baseline  Respiratory status: acceptable  Hydration status: euvolemic  Multimodal analgesia pain management approach

## 2022-07-27 NOTE — H&P
History and Physical / Pre-Sedation Assessment    Patient:  Pinky Mccoy   :   1950     Intended Procedure:  colonoscopy    HPI: h/o polyps. Fh of ovarian cancer    Past Medical History:   has a past medical history of Hyperlipidemia, Hypertension, and Seizure ([facility] [address] Past Surgical History:   has a past surgical history that includes Hand surgery (Right, ); Colonoscopy (2012); joint replacement (Left, ); and joint replacement (Right, ). Medications:  Prior to Admission medications    Medication Sig Start Date End Date Taking? Authorizing Provider   fluticasone (FLONASE) 50 MCG/ACT nasal spray 2 sprays by Nasal route daily 21   Historical Provider, MD   traZODone (DESYREL) 50 MG tablet Take 50 mg by mouth nightly as needed 21   Historical Provider, MD   traMADol (ULTRAM) 50 MG tablet Take 1 tablet by mouth. 20   Historical Provider, MD   rosuvastatin (CRESTOR) 10 MG tablet TAKE 1 TABLET BY MOUTH EVERY DAY 18   TESSY Thurston - CNP   naproxen (NAPROSYN) 500 MG tablet TAKE 1 TABLET BY MOUTH TWICE A DAY WITH MEALS 17   Denisse Pan MD   dutasteride (AVODART) 0.5 MG capsule TAKE ONE CAPSULE BY MOUTH EVERY DAY 11/10/17   Denisse Pan MD   lisinopril-hydrochlorothiazide (PRINZIDE;ZESTORETIC) 10-12.5 MG per tablet TAKE 1 TABLET EVERY DAY 11/10/17   Denisse Pan MD   aspirin 81 MG EC tablet Take 81 mg by mouth daily. Historical Provider, MD       Family History:  family history includes Cancer in his father and mother; Heart Disease in his father. Social History:   reports that he quit smoking about 15 years ago. His smoking use included cigarettes. He has never used smokeless tobacco. He reports that he does not currently use alcohol. He reports that he does not use drugs. Allergies:  Patient has no known allergies. ROS:  twelve point system review was unremarkable except for above noted history.     Nurses notes reviewed and agreed. Medications reviewed    Physical Exam:  Vital Signs: /78   Pulse 80   Temp 97.9 °F (36.6 °C) (Temporal)   Resp 16   Ht 6' (1.829 m)   Wt 295 lb (133.8 kg)   SpO2 99%   BMI 40.01 kg/m²    Skin: normal  HEENT: normal  Neck: supple. No adenopathy. No thyromegaly. No JVD. Pulmonary:Normal  Cardiac:Normal  Abdomen:Normal  MS: normal  Neuro: normal  Ext: no edema. Pulses normal    Pre-Procedure Assessment / Plan:  ASA 2 - Patient with mild systemic disease with no functional limitations  Mallampati Airway Assessment:  Mallampati Class II - (soft palate, fauces & uvula are visible)  Level of Sedation Plan: Moderate sedation  Post Procedure plan: Return to same level of care    I assessed the patient and find that the patient is in satisfactory condition to proceed with the planned procedure and sedation plan. I have explained the risk, benefits, and alternatives to the procedure. The patient understands and agrees to proceed.   Yes    Jessenia Tee MD  8:16 AM 7/27/2022

## 2022-07-27 NOTE — OP NOTE
[address], [address]                                OPERATIVE REPORT    PATIENT NAME: El Ennis                     :        1950  MED REC NO:   [id number]                          ROOM:  ACCOUNT NO:   [de-identified]                           ADMIT DATE: 2022  PROVIDER:     Alexandra Choe MD    DATE OF PROCEDURE:  2022    SURGEON:  Alexandra Choe MD    INDICATION OF PROCEDURE:  1. History of polyps. 2.  Rectal bleed. 3.  Family history of ovarian cancer. DESCRIPTION OF PROCEDURE:  With the patient in the left lateral position  and after IV Diprivan, the Olympus video colonoscope was inserted into  the rectum and carefully advanced to the cecum. Small polyp was seen in  the rectum. We removed it with the biopsy forceps. Careful inspection  revealed no other abnormality. Scope was then removed without  complication. IMPRESSION:  Rectal polyp. ESTIMATED BLOOD LOSS:  None. Thomas Gatica MD    D: 2022 10:32:13       T: 2022 10:58:17     [email or web address]  Job#: [id number]     Doc#: [id number]    CC:   Alexandra Choe MD

## (undated) DEVICE — CO2 CANNULA,SUPERSOFT, ADLT,7'O2,7'CO2: Brand: MEDLINE

## (undated) DEVICE — FORCEPS BX L240CM JAW DIA2.4MM ORNG L CAP W/ NDL DISP RAD